# Patient Record
Sex: MALE | Race: WHITE | NOT HISPANIC OR LATINO | Employment: OTHER | URBAN - METROPOLITAN AREA
[De-identification: names, ages, dates, MRNs, and addresses within clinical notes are randomized per-mention and may not be internally consistent; named-entity substitution may affect disease eponyms.]

---

## 2023-03-29 DIAGNOSIS — E78.5 HYPERLIPIDEMIA, UNSPECIFIED: ICD-10-CM

## 2023-03-29 PROBLEM — I49.5 SINUS NODE DYSFUNCTION (MULTI): Status: ACTIVE | Noted: 2023-03-29

## 2023-03-29 PROBLEM — N18.30 CHRONIC KIDNEY DISEASE, STAGE 3 (MULTI): Status: ACTIVE | Noted: 2023-03-29

## 2023-03-29 PROBLEM — N18.30 CKD STAGE 3 SECONDARY TO DIABETES (MULTI): Status: ACTIVE | Noted: 2023-03-29

## 2023-03-29 PROBLEM — F03.90 DEMENTIA (MULTI): Status: ACTIVE | Noted: 2023-03-29

## 2023-03-29 PROBLEM — E11.22 CKD STAGE 3 SECONDARY TO DIABETES (MULTI): Status: ACTIVE | Noted: 2023-03-29

## 2023-03-29 PROBLEM — E11.3293 MILD NONPROLIFERATIVE DIABETIC RETINOPATHY OF BOTH EYES WITHOUT MACULAR EDEMA ASSOCIATED WITH TYPE 2 DIABETES MELLITUS (MULTI): Status: ACTIVE | Noted: 2023-03-29

## 2023-03-29 PROBLEM — Z86.0100 HISTORY OF COLON POLYPS: Status: ACTIVE | Noted: 2023-03-29

## 2023-03-29 PROBLEM — Z95.0 PACEMAKER: Status: ACTIVE | Noted: 2023-03-29

## 2023-03-29 PROBLEM — L40.9 PSORIASIS: Status: ACTIVE | Noted: 2023-03-29

## 2023-03-29 PROBLEM — J45.909 CHRONIC ASTHMA (HHS-HCC): Status: ACTIVE | Noted: 2023-03-29

## 2023-03-29 PROBLEM — G47.33 OBSTRUCTIVE SLEEP APNEA: Status: ACTIVE | Noted: 2023-03-29

## 2023-03-29 PROBLEM — Z86.010 HISTORY OF COLON POLYPS: Status: ACTIVE | Noted: 2023-03-29

## 2023-03-29 PROBLEM — I10 BENIGN ESSENTIAL HYPERTENSION: Status: ACTIVE | Noted: 2023-03-29

## 2023-03-29 PROBLEM — I49.9 IRREGULAR HEART RATE: Status: ACTIVE | Noted: 2023-03-29

## 2023-03-29 RX ORDER — DULAGLUTIDE 1.5 MG/.5ML
INJECTION, SOLUTION SUBCUTANEOUS
COMMUNITY
Start: 2020-11-02 | End: 2023-05-11 | Stop reason: WASHOUT

## 2023-03-29 RX ORDER — INSULIN ASPART 100 [IU]/ML
INJECTION, SUSPENSION SUBCUTANEOUS
COMMUNITY
Start: 2023-02-16

## 2023-03-29 RX ORDER — LOSARTAN POTASSIUM 50 MG/1
1 TABLET ORAL DAILY
COMMUNITY
Start: 2022-08-29 | End: 2023-05-11 | Stop reason: WASHOUT

## 2023-03-29 RX ORDER — HUMAN INSULIN 100 [IU]/ML
INJECTION, SUSPENSION SUBCUTANEOUS
COMMUNITY
Start: 2020-06-10 | End: 2023-11-14 | Stop reason: WASHOUT

## 2023-03-29 RX ORDER — CALCIPOTRIENE 50 UG/G
OINTMENT TOPICAL
COMMUNITY
Start: 2019-10-31 | End: 2023-05-11 | Stop reason: WASHOUT

## 2023-03-29 RX ORDER — ROSUVASTATIN CALCIUM 10 MG/1
TABLET, COATED ORAL
Qty: 90 TABLET | Refills: 3 | Status: SHIPPED | OUTPATIENT
Start: 2023-03-29 | End: 2024-02-26

## 2023-03-29 RX ORDER — FLUTICASONE PROPIONATE AND SALMETEROL 250; 50 UG/1; UG/1
1 POWDER RESPIRATORY (INHALATION) 2 TIMES DAILY
COMMUNITY

## 2023-03-29 RX ORDER — OLMESARTAN MEDOXOMIL 20 MG/1
1 TABLET ORAL 2 TIMES DAILY
COMMUNITY
Start: 2016-09-15 | End: 2024-05-13 | Stop reason: WASHOUT

## 2023-03-29 RX ORDER — AMLODIPINE BESYLATE 2.5 MG/1
2.5 TABLET ORAL DAILY
COMMUNITY
End: 2024-05-13 | Stop reason: WASHOUT

## 2023-03-29 RX ORDER — ASPIRIN 81 MG/1
1 TABLET ORAL DAILY
COMMUNITY

## 2023-03-29 RX ORDER — SEMAGLUTIDE 1.34 MG/ML
1 INJECTION, SOLUTION SUBCUTANEOUS
COMMUNITY
Start: 2023-02-24

## 2023-03-29 RX ORDER — PEN NEEDLE, DIABETIC 32 GX 1/4"
NEEDLE, DISPOSABLE MISCELLANEOUS
COMMUNITY
Start: 2022-06-14

## 2023-03-29 RX ORDER — LOSARTAN POTASSIUM 100 MG/1
TABLET ORAL
COMMUNITY
Start: 2022-05-09 | End: 2023-06-30 | Stop reason: SDUPTHER

## 2023-03-29 RX ORDER — METFORMIN HYDROCHLORIDE 500 MG/1
1 TABLET ORAL 2 TIMES DAILY
COMMUNITY
Start: 2015-12-21

## 2023-03-29 RX ORDER — DONEPEZIL HYDROCHLORIDE 10 MG/1
10 TABLET, FILM COATED ORAL DAILY
COMMUNITY
End: 2023-04-30

## 2023-03-29 RX ORDER — ACETAMINOPHEN 500 MG
1 TABLET ORAL DAILY
COMMUNITY

## 2023-03-29 RX ORDER — EZETIMIBE 10 MG/1
10 TABLET ORAL DAILY
COMMUNITY

## 2023-03-29 RX ORDER — ROSUVASTATIN CALCIUM 10 MG/1
10 TABLET, COATED ORAL NIGHTLY
COMMUNITY
End: 2023-03-29 | Stop reason: SDUPTHER

## 2023-03-29 RX ORDER — MEMANTINE HYDROCHLORIDE 28 MG/1
1 CAPSULE, EXTENDED RELEASE ORAL DAILY
COMMUNITY
Start: 2019-12-09 | End: 2023-06-30

## 2023-03-29 RX ORDER — DULAGLUTIDE 3 MG/.5ML
3 INJECTION, SOLUTION SUBCUTANEOUS
COMMUNITY
End: 2023-05-11 | Stop reason: WASHOUT

## 2023-03-29 RX ORDER — DAPAGLIFLOZIN 5 MG/1
10 TABLET, FILM COATED ORAL DAILY
COMMUNITY

## 2023-03-29 RX ORDER — INSULIN ASPART 100 [IU]/ML
INJECTION, SOLUTION INTRAVENOUS; SUBCUTANEOUS
COMMUNITY
Start: 2020-06-03 | End: 2023-05-11 | Stop reason: WASHOUT

## 2023-04-27 LAB
ALBUMIN (G/DL) IN SER/PLAS: 4.1 G/DL (ref 3.4–5)
ALBUMIN (MG/L) IN URINE: 332.5 MG/L
ALBUMIN/CREATININE (UG/MG) IN URINE: 407 UG/MG CRT (ref 0–30)
ANION GAP IN SER/PLAS: 11 MMOL/L (ref 10–20)
APPEARANCE, URINE: CLEAR
BILIRUBIN, URINE: NEGATIVE
BLOOD, URINE: NEGATIVE
CALCIDIOL (25 OH VITAMIN D3) (NG/ML) IN SER/PLAS: 64 NG/ML
CALCIUM (MG/DL) IN SER/PLAS: 9.5 MG/DL (ref 8.6–10.3)
CARBON DIOXIDE, TOTAL (MMOL/L) IN SER/PLAS: 31 MMOL/L (ref 21–32)
CHLORIDE (MMOL/L) IN SER/PLAS: 103 MMOL/L (ref 98–107)
COLOR, URINE: YELLOW
CREATININE (MG/DL) IN SER/PLAS: 1.16 MG/DL (ref 0.5–1.3)
CREATININE (MG/DL) IN URINE: 81.7 MG/DL (ref 20–370)
GFR MALE: 64 ML/MIN/1.73M2
GLUCOSE (MG/DL) IN SER/PLAS: 188 MG/DL (ref 74–99)
GLUCOSE, URINE: ABNORMAL MG/DL
KETONES, URINE: ABNORMAL MG/DL
LEUKOCYTE ESTERASE, URINE: NEGATIVE
NITRITE, URINE: NEGATIVE
PH, URINE: 5.5 (ref 5–8)
PHOSPHATE (MG/DL) IN SER/PLAS: 3.9 MG/DL (ref 2.5–4.9)
POTASSIUM (MMOL/L) IN SER/PLAS: 4.5 MMOL/L (ref 3.5–5.3)
PROTEIN, URINE: ABNORMAL MG/DL
RBC, URINE: 1 /HPF (ref 0–5)
SODIUM (MMOL/L) IN SER/PLAS: 140 MMOL/L (ref 136–145)
SPECIFIC GRAVITY, URINE: 1.01 (ref 1–1.03)
SQUAMOUS EPITHELIAL CELLS, URINE: <1 /HPF
UREA NITROGEN (MG/DL) IN SER/PLAS: 24 MG/DL (ref 6–23)
UROBILINOGEN, URINE: <2 MG/DL (ref 0–1.9)
WBC, URINE: 1 /HPF (ref 0–5)

## 2023-04-28 DIAGNOSIS — F03.90 UNSPECIFIED DEMENTIA, UNSPECIFIED SEVERITY, WITHOUT BEHAVIORAL DISTURBANCE, PSYCHOTIC DISTURBANCE, MOOD DISTURBANCE, AND ANXIETY (MULTI): ICD-10-CM

## 2023-04-28 LAB — PARATHYRIN INTACT (PG/ML) IN SER/PLAS: 73.2 PG/ML (ref 18.5–88)

## 2023-04-30 RX ORDER — DONEPEZIL HYDROCHLORIDE 10 MG/1
TABLET, FILM COATED ORAL
Qty: 90 TABLET | Refills: 3 | Status: SHIPPED | OUTPATIENT
Start: 2023-04-30

## 2023-05-10 PROBLEM — E11.3299 MILD NONPROLIFERATIVE DIABETIC RETINOPATHY WITHOUT MACULAR EDEMA ASSOCIATED WITH TYPE 2 DIABETES MELLITUS (MULTI): Status: ACTIVE | Noted: 2017-05-22

## 2023-05-10 PROBLEM — R80.9 ALBUMINURIA: Status: ACTIVE | Noted: 2017-05-22

## 2023-05-10 NOTE — PATIENT INSTRUCTIONS
Thank you for choosing [facility] for your healthcare.   As always if you have any questions or concerns please do not hesitate to call our office at [phone] or through [employer].    Have a great day!  Fatou Dawn, DO

## 2023-05-11 ENCOUNTER — OFFICE VISIT (OUTPATIENT)
Dept: PRIMARY CARE | Facility: CLINIC | Age: 78
End: 2023-05-11
Payer: MEDICARE

## 2023-05-11 VITALS
SYSTOLIC BLOOD PRESSURE: 112 MMHG | WEIGHT: 178 LBS | BODY MASS INDEX: 25.48 KG/M2 | HEIGHT: 70 IN | HEART RATE: 64 BPM | OXYGEN SATURATION: 98 % | DIASTOLIC BLOOD PRESSURE: 71 MMHG

## 2023-05-11 DIAGNOSIS — N18.30 CKD STAGE 3 SECONDARY TO DIABETES (MULTI): Chronic | ICD-10-CM

## 2023-05-11 DIAGNOSIS — E11.22 CKD STAGE 3 SECONDARY TO DIABETES (MULTI): Chronic | ICD-10-CM

## 2023-05-11 DIAGNOSIS — E11.65 TYPE 2 DIABETES MELLITUS WITH HYPERGLYCEMIA, WITH LONG-TERM CURRENT USE OF INSULIN (MULTI): Chronic | ICD-10-CM

## 2023-05-11 DIAGNOSIS — Z00.00 ROUTINE GENERAL MEDICAL EXAMINATION AT HEALTH CARE FACILITY: Primary | ICD-10-CM

## 2023-05-11 DIAGNOSIS — Z79.4 TYPE 2 DIABETES MELLITUS WITH HYPERGLYCEMIA, WITH LONG-TERM CURRENT USE OF INSULIN (MULTI): Chronic | ICD-10-CM

## 2023-05-11 DIAGNOSIS — I10 BENIGN ESSENTIAL HYPERTENSION: ICD-10-CM

## 2023-05-11 DIAGNOSIS — J45.30 MILD PERSISTENT ASTHMA WITHOUT COMPLICATION (HHS-HCC): ICD-10-CM

## 2023-05-11 PROBLEM — F03.90 DEMENTIA (MULTI): Chronic | Status: ACTIVE | Noted: 2023-03-29

## 2023-05-11 PROCEDURE — 3078F DIAST BP <80 MM HG: CPT | Performed by: FAMILY MEDICINE

## 2023-05-11 PROCEDURE — 1036F TOBACCO NON-USER: CPT | Performed by: FAMILY MEDICINE

## 2023-05-11 PROCEDURE — 3074F SYST BP LT 130 MM HG: CPT | Performed by: FAMILY MEDICINE

## 2023-05-11 PROCEDURE — 1159F MED LIST DOCD IN RCRD: CPT | Performed by: FAMILY MEDICINE

## 2023-05-11 PROCEDURE — 1160F RVW MEDS BY RX/DR IN RCRD: CPT | Performed by: FAMILY MEDICINE

## 2023-05-11 PROCEDURE — G0439 PPPS, SUBSEQ VISIT: HCPCS | Performed by: FAMILY MEDICINE

## 2023-05-11 PROCEDURE — 1170F FXNL STATUS ASSESSED: CPT | Performed by: FAMILY MEDICINE

## 2023-05-11 RX ORDER — HYDROCHLOROTHIAZIDE 25 MG/1
TABLET ORAL
COMMUNITY
Start: 2023-05-02 | End: 2023-05-11 | Stop reason: WASHOUT

## 2023-05-11 SDOH — ECONOMIC STABILITY: FOOD INSECURITY: WITHIN THE PAST 12 MONTHS, YOU WORRIED THAT YOUR FOOD WOULD RUN OUT BEFORE YOU GOT MONEY TO BUY MORE.: NEVER TRUE

## 2023-05-11 SDOH — ECONOMIC STABILITY: FOOD INSECURITY: WITHIN THE PAST 12 MONTHS, THE FOOD YOU BOUGHT JUST DIDN'T LAST AND YOU DIDN'T HAVE MONEY TO GET MORE.: NEVER TRUE

## 2023-05-11 ASSESSMENT — ACTIVITIES OF DAILY LIVING (ADL)
DOING_HOUSEWORK: INDEPENDENT
GROCERY_SHOPPING: INDEPENDENT
DRESSING: INDEPENDENT
BATHING: INDEPENDENT
TAKING_MEDICATION: INDEPENDENT
MANAGING_FINANCES: INDEPENDENT

## 2023-05-11 ASSESSMENT — LIFESTYLE VARIABLES
HOW MANY STANDARD DRINKS CONTAINING ALCOHOL DO YOU HAVE ON A TYPICAL DAY: 1 OR 2
HOW OFTEN DO YOU HAVE A DRINK CONTAINING ALCOHOL: MONTHLY OR LESS
SKIP TO QUESTIONS 9-10: 1
HOW OFTEN DO YOU HAVE SIX OR MORE DRINKS ON ONE OCCASION: NEVER
AUDIT-C TOTAL SCORE: 1

## 2023-05-11 ASSESSMENT — ENCOUNTER SYMPTOMS
DEPRESSION: 0
LOSS OF SENSATION IN FEET: 0
OCCASIONAL FEELINGS OF UNSTEADINESS: 0

## 2023-05-11 ASSESSMENT — PATIENT HEALTH QUESTIONNAIRE - PHQ9
1. LITTLE INTEREST OR PLEASURE IN DOING THINGS: NOT AT ALL
SUM OF ALL RESPONSES TO PHQ9 QUESTIONS 1 & 2: 0
2. FEELING DOWN, DEPRESSED OR HOPELESS: NOT AT ALL

## 2023-05-11 NOTE — ASSESSMENT & PLAN NOTE
On losartan from nephrology. Hydrochlorothiazide was recently added but had episode of hypotension. Discussed decreasing hydrochlorothiazide to half tab

## 2023-05-11 NOTE — PROGRESS NOTES
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     Past Medical, Surgical, and Family History reviewed and updated in chart.    Reviewed all medications by prescribing practitioner or clinical pharmacist (such as prescriptions, OTCs, herbal therapies and supplements) and documented in the medical record.    Endo switched him from Trulicity to Ozempic due to cost.    Nephrology added hydrochlorothiazide last week but patient experienced hypotension.         Patient Care Team:  Fatou Dawn DO as PCP - General  Jeff Shaver MD as Consulting Physician (Nephrology)  Greg Carrington MD as Consulting Physician (Endocrinology)     Review of Systems    Objective   Vitals:  /71   Pulse 64   Ht 1.778 m (5' 10\")   Wt 80.7 kg (178 lb)   SpO2 98%   BMI 25.54 kg/m²       Physical Exam  Constitutional: Well nourished, well developed, appears stated age  Eyes: no scleral icterus, no conjunctival injection  Ears: normal external auditory canal, no retraction or bulging of tympanic membranes  Neck: no thyromegaly  Cardiovascular: regular rate and rhythm, no leg edema  Respiratory: normal respiratory effort, clear to auscultation bilaterally  Skin: Warm, dry. No rashes  Neurologic: Alert, CNs II-XII grossly intact..  Psych: Appropriate mood and affect.      Assessment/Plan   Problem List Items Addressed This Visit          Respiratory    Mild persistent asthma without complication    Current Assessment & Plan     Stable on Adviar             Circulatory    Benign essential hypertension    Current Assessment & Plan     On losartan from nephrology. Hydrochlorothiazide was recently added but had episode of hypotension. Discussed decreasing hydrochlorothiazide to half tab            Endocrine/Metabolic    CKD stage 3 secondary to diabetes (CMS/[facility]) (Chronic)    Current Assessment & Plan     Managed by Dr. Shaver         Type 2 diabetes mellitus with " hyperglycemia, with long-term current use of insulin (CMS/[facility]) (Chronic)    Current Assessment & Plan     Managed by endocrinology          Other Visit Diagnoses       Routine general medical examination at health care facility    -  Primary    Colonoscopy done 7/2017 - normal            Follow up 6 months.   Virginia Factor, DO

## 2023-06-27 DIAGNOSIS — F03.90 UNSPECIFIED DEMENTIA, UNSPECIFIED SEVERITY, WITHOUT BEHAVIORAL DISTURBANCE, PSYCHOTIC DISTURBANCE, MOOD DISTURBANCE, AND ANXIETY (MULTI): ICD-10-CM

## 2023-06-27 DIAGNOSIS — I10 ESSENTIAL (PRIMARY) HYPERTENSION: ICD-10-CM

## 2023-06-30 RX ORDER — LOSARTAN POTASSIUM 50 MG/1
TABLET ORAL
Qty: 90 TABLET | Refills: 3 | Status: SHIPPED | OUTPATIENT
Start: 2023-06-30

## 2023-06-30 RX ORDER — FLASH GLUCOSE SCANNING READER
EACH MISCELLANEOUS AS NEEDED
COMMUNITY
Start: 2023-06-06

## 2023-06-30 RX ORDER — FLASH GLUCOSE SENSOR
KIT MISCELLANEOUS AS NEEDED
COMMUNITY
Start: 2023-06-06

## 2023-06-30 RX ORDER — MEMANTINE HYDROCHLORIDE 28 MG/1
CAPSULE, EXTENDED RELEASE ORAL
Qty: 90 CAPSULE | Refills: 3 | Status: SHIPPED | OUTPATIENT
Start: 2023-06-30

## 2023-09-18 RX ORDER — LOSARTAN POTASSIUM 100 MG/1
100 TABLET ORAL DAILY
COMMUNITY
End: 2023-11-14 | Stop reason: SDUPTHER

## 2023-10-17 ENCOUNTER — HOSPITAL ENCOUNTER (OUTPATIENT)
Dept: CARDIOLOGY | Facility: HOSPITAL | Age: 78
Discharge: HOME | End: 2023-10-17
Payer: MEDICARE

## 2023-10-17 DIAGNOSIS — Z95.0 PRESENCE OF CARDIAC PACEMAKER: ICD-10-CM

## 2023-10-17 DIAGNOSIS — I48.91 ATRIAL FIBRILLATION, UNSPECIFIED TYPE (MULTI): Primary | ICD-10-CM

## 2023-10-17 DIAGNOSIS — I48.91 UNSPECIFIED ATRIAL FIBRILLATION (MULTI): ICD-10-CM

## 2023-10-17 PROCEDURE — 93294 REM INTERROG EVL PM/LDLS PM: CPT | Performed by: INTERNAL MEDICINE

## 2023-10-17 PROCEDURE — 93296 REM INTERROG EVL PM/IDS: CPT

## 2023-10-18 ENCOUNTER — HOSPITAL ENCOUNTER (OUTPATIENT)
Dept: CARDIOLOGY | Facility: HOSPITAL | Age: 78
Discharge: HOME | End: 2023-10-18
Payer: MEDICARE

## 2023-10-18 DIAGNOSIS — I48.91 ATRIAL FIBRILLATION, UNSPECIFIED TYPE (MULTI): ICD-10-CM

## 2023-10-23 ENCOUNTER — LAB (OUTPATIENT)
Dept: LAB | Facility: LAB | Age: 78
End: 2023-10-23
Payer: MEDICARE

## 2023-10-23 DIAGNOSIS — E11.65 TYPE 2 DIABETES MELLITUS WITH HYPERGLYCEMIA (MULTI): ICD-10-CM

## 2023-10-23 DIAGNOSIS — N18.31 CHRONIC KIDNEY DISEASE, STAGE 3A (MULTI): Primary | ICD-10-CM

## 2023-10-23 DIAGNOSIS — Z79.4 LONG TERM (CURRENT) USE OF INSULIN (MULTI): ICD-10-CM

## 2023-10-23 LAB
25(OH)D3 SERPL-MCNC: 48 NG/ML (ref 30–100)
ALBUMIN SERPL BCP-MCNC: 4.5 G/DL (ref 3.4–5)
ALP SERPL-CCNC: 62 U/L (ref 33–136)
ALT SERPL W P-5'-P-CCNC: 30 U/L (ref 10–52)
ANION GAP SERPL CALC-SCNC: 12 MMOL/L (ref 10–20)
APPEARANCE UR: CLEAR
AST SERPL W P-5'-P-CCNC: 27 U/L (ref 9–39)
BILIRUB SERPL-MCNC: 1.6 MG/DL (ref 0–1.2)
BILIRUB UR STRIP.AUTO-MCNC: NEGATIVE MG/DL
BUN SERPL-MCNC: 21 MG/DL (ref 6–23)
CALCIUM SERPL-MCNC: 10.2 MG/DL (ref 8.6–10.3)
CHLORIDE SERPL-SCNC: 104 MMOL/L (ref 98–107)
CHOLEST SERPL-MCNC: 79 MG/DL (ref 0–199)
CHOLESTEROL/HDL RATIO: 2.5
CO2 SERPL-SCNC: 30 MMOL/L (ref 21–32)
COLOR UR: YELLOW
CREAT SERPL-MCNC: 1.1 MG/DL (ref 0.5–1.3)
CREAT UR-MCNC: 95.4 MG/DL (ref 20–370)
GFR SERPL CREATININE-BSD FRML MDRD: 68 ML/MIN/1.73M*2
GLUCOSE SERPL-MCNC: 179 MG/DL (ref 74–99)
GLUCOSE UR STRIP.AUTO-MCNC: ABNORMAL MG/DL
HDLC SERPL-MCNC: 31.8 MG/DL
KETONES UR STRIP.AUTO-MCNC: NEGATIVE MG/DL
LDLC SERPL CALC-MCNC: 20 MG/DL
LEUKOCYTE ESTERASE UR QL STRIP.AUTO: NEGATIVE
MICROALBUMIN UR-MCNC: 259.6 MG/L
MICROALBUMIN/CREAT UR: 272.1 UG/MG CREAT
MUCOUS THREADS #/AREA URNS AUTO: NORMAL /LPF
NITRITE UR QL STRIP.AUTO: NEGATIVE
NON HDL CHOLESTEROL: 47 MG/DL (ref 0–149)
PH UR STRIP.AUTO: 6 [PH]
PHOSPHATE SERPL-MCNC: 4.3 MG/DL (ref 2.5–4.9)
POTASSIUM SERPL-SCNC: 4.5 MMOL/L (ref 3.5–5.3)
PROT SERPL-MCNC: 6.9 G/DL (ref 6.4–8.2)
PROT UR STRIP.AUTO-MCNC: ABNORMAL MG/DL
PTH-INTACT SERPL-MCNC: 50.6 PG/ML (ref 18.5–88)
RBC # UR STRIP.AUTO: NEGATIVE /UL
RBC #/AREA URNS AUTO: NORMAL /HPF
SODIUM SERPL-SCNC: 141 MMOL/L (ref 136–145)
SP GR UR STRIP.AUTO: 1.03
TRIGL SERPL-MCNC: 136 MG/DL (ref 0–149)
TSH SERPL-ACNC: 2.7 MIU/L (ref 0.44–3.98)
UROBILINOGEN UR STRIP.AUTO-MCNC: <2 MG/DL
VLDL: 27 MG/DL (ref 0–40)
WBC #/AREA URNS AUTO: NORMAL /HPF

## 2023-10-23 PROCEDURE — 81001 URINALYSIS AUTO W/SCOPE: CPT

## 2023-10-23 PROCEDURE — 80061 LIPID PANEL: CPT

## 2023-10-23 PROCEDURE — 83970 ASSAY OF PARATHORMONE: CPT

## 2023-10-23 PROCEDURE — 84100 ASSAY OF PHOSPHORUS: CPT

## 2023-10-23 PROCEDURE — 82570 ASSAY OF URINE CREATININE: CPT

## 2023-10-23 PROCEDURE — 82306 VITAMIN D 25 HYDROXY: CPT

## 2023-10-23 PROCEDURE — 80053 COMPREHEN METABOLIC PANEL: CPT

## 2023-10-23 PROCEDURE — 82043 UR ALBUMIN QUANTITATIVE: CPT

## 2023-10-23 PROCEDURE — 84443 ASSAY THYROID STIM HORMONE: CPT

## 2023-10-23 PROCEDURE — 83036 HEMOGLOBIN GLYCOSYLATED A1C: CPT

## 2023-10-23 PROCEDURE — 36415 COLL VENOUS BLD VENIPUNCTURE: CPT

## 2023-10-24 LAB
EST. AVERAGE GLUCOSE BLD GHB EST-MCNC: 177 MG/DL
HBA1C MFR BLD: 7.8 %

## 2023-11-14 ENCOUNTER — OFFICE VISIT (OUTPATIENT)
Dept: PRIMARY CARE | Facility: CLINIC | Age: 78
End: 2023-11-14
Payer: MEDICARE

## 2023-11-14 VITALS
DIASTOLIC BLOOD PRESSURE: 70 MMHG | OXYGEN SATURATION: 98 % | HEART RATE: 78 BPM | WEIGHT: 168 LBS | BODY MASS INDEX: 24.05 KG/M2 | TEMPERATURE: 97.5 F | RESPIRATION RATE: 16 BRPM | SYSTOLIC BLOOD PRESSURE: 130 MMHG | HEIGHT: 70 IN

## 2023-11-14 DIAGNOSIS — F03.B0 MODERATE DEMENTIA WITHOUT BEHAVIORAL DISTURBANCE, PSYCHOTIC DISTURBANCE, MOOD DISTURBANCE, OR ANXIETY, UNSPECIFIED DEMENTIA TYPE (MULTI): Primary | Chronic | ICD-10-CM

## 2023-11-14 DIAGNOSIS — E11.65 TYPE 2 DIABETES MELLITUS WITH HYPERGLYCEMIA, WITH LONG-TERM CURRENT USE OF INSULIN (MULTI): Chronic | ICD-10-CM

## 2023-11-14 DIAGNOSIS — Z79.4 TYPE 2 DIABETES MELLITUS WITH HYPERGLYCEMIA, WITH LONG-TERM CURRENT USE OF INSULIN (MULTI): Chronic | ICD-10-CM

## 2023-11-14 PROBLEM — I49.5 SINUS NODE DYSFUNCTION (MULTI): Status: RESOLVED | Noted: 2023-03-29 | Resolved: 2023-11-14

## 2023-11-14 PROBLEM — I49.9 IRREGULAR HEART RATE: Status: RESOLVED | Noted: 2023-03-29 | Resolved: 2023-11-14

## 2023-11-14 PROCEDURE — 1126F AMNT PAIN NOTED NONE PRSNT: CPT | Performed by: FAMILY MEDICINE

## 2023-11-14 PROCEDURE — 1159F MED LIST DOCD IN RCRD: CPT | Performed by: FAMILY MEDICINE

## 2023-11-14 PROCEDURE — 3075F SYST BP GE 130 - 139MM HG: CPT | Performed by: FAMILY MEDICINE

## 2023-11-14 PROCEDURE — 1160F RVW MEDS BY RX/DR IN RCRD: CPT | Performed by: FAMILY MEDICINE

## 2023-11-14 PROCEDURE — 99213 OFFICE O/P EST LOW 20 MIN: CPT | Performed by: FAMILY MEDICINE

## 2023-11-14 PROCEDURE — 1036F TOBACCO NON-USER: CPT | Performed by: FAMILY MEDICINE

## 2023-11-14 PROCEDURE — 3078F DIAST BP <80 MM HG: CPT | Performed by: FAMILY MEDICINE

## 2023-11-14 RX ORDER — CICLOPIROX OLAMINE 7.7 MG/G
CREAM TOPICAL 2 TIMES DAILY
COMMUNITY
Start: 2023-09-06 | End: 2023-11-14 | Stop reason: ALTCHOICE

## 2023-11-14 ASSESSMENT — LIFESTYLE VARIABLES
SKIP TO QUESTIONS 9-10: 1
HOW OFTEN DO YOU HAVE SIX OR MORE DRINKS ON ONE OCCASION: NEVER
HOW MANY STANDARD DRINKS CONTAINING ALCOHOL DO YOU HAVE ON A TYPICAL DAY: PATIENT DOES NOT DRINK
AUDIT-C TOTAL SCORE: 0
HOW OFTEN DO YOU HAVE A DRINK CONTAINING ALCOHOL: NEVER

## 2023-11-14 ASSESSMENT — PATIENT HEALTH QUESTIONNAIRE - PHQ9
2. FEELING DOWN, DEPRESSED OR HOPELESS: NOT AT ALL
1. LITTLE INTEREST OR PLEASURE IN DOING THINGS: NOT AT ALL
SUM OF ALL RESPONSES TO PHQ9 QUESTIONS 1 & 2: 0

## 2023-11-14 ASSESSMENT — PAIN SCALES - GENERAL: PAINLEVEL: 0-NO PAIN

## 2023-11-14 NOTE — ASSESSMENT & PLAN NOTE
Managed by endocrinology  Improving  Patient assistance program application for Ozempic provided  Diabetic foot exam done today

## 2023-11-14 NOTE — PROGRESS NOTES
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 No concerns.) and Diabetes.  Followed up with nephrology recently and kidney function has improved on Farxiga.    No new problems or concerns.     Established with podiatry for diabetic nail care.             Current Outpatient Medications   Medication Sig Dispense Refill    alendronate-vitamin D3 (Fosamax Plus D) 70 mg- 5,600 unit tablet Take by mouth.      amLODIPine (Norvasc) 2.5 mg tablet Take 1 tablet (2.5 mg) by mouth once daily.      aspirin 81 mg EC tablet Take 1 tablet (81 mg) by mouth once daily.      BD Ultra-Fine Micro Pen Needle 32 gauge x 1/4\" needle use as directed      cholecalciferol (Vitamin D-3) 5,000 Units tablet Take 1 tablet (5,000 Units) by mouth once daily.      dapagliflozin (Farxiga) 5 mg Take by mouth.      donepezil (Aricept) 10 mg tablet TAKE 1 TABLET BY MOUTH EVERY DAY AS DIRECTED 90 tablet 3    ezetimibe (Zetia) 10 mg tablet Take 1 tablet (10 mg) by mouth once daily.      fluticasone propion-salmeteroL (Advair Diskus) 250-50 mcg/dose diskus inhaler Inhale 1 puff 2 times a day.      FreeStyle Katie 2 [place] misc Inject under the skin if needed.      FreeStyle Katie 2 Sensor kit Inject under the skin if needed.      losartan (Cozaar) 50 mg tablet TAKE 1 TABLET BY MOUTH EVERY DAY 90 tablet 3    memantine (Namenda) 28 mg capsule,sprinkle,ER 24hr TAKE 1 CAPSULE BY MOUTH EVERY DAY 90 capsule 3    metFORMIN (Glucophage) 500 mg tablet Take 1 tablet (500 mg) by mouth 2 times a day.      multivitamin with minerals (multivitamin-iron-folic acid) tablet Take 1 tablet by mouth once daily.      NovoLOG Mix 70-30FlexPen U-100 100 unit/mL (70-30) injection INJECT 45 UNITS BEFORE BREAKFAST AND 10 UNITS BEFORE DINNER      olmesartan (BENIcar) 20 mg tablet Take 1 tablet (20 mg) by mouth 2 times a day.      Ozempic 1 mg/dose (4 mg/3 mL) pen injector Inject 1 mg under the skin 1 (one) time per " "week.      rosuvastatin (Crestor) 10 mg tablet TAKE 1 TABLET BY MOUTH EVERYDAY AT BEDTIME 90 tablet 3     No current facility-administered medications for this visit.       Objective     Visit Vitals  /70 (BP Location: Left arm, Patient Position: Sitting, BP Cuff Size: Large adult)   Pulse 78   Temp 36.4 °C (97.5 °F)   Resp 16   Ht 1.778 m (5' 10\")   Wt 76.2 kg (168 lb)   SpO2 98%   BMI 24.11 kg/m²   Smoking Status Former   BSA 1.94 m²        Constitutional: Well nourished, well developed, appears stated age  Eyes: no scleral icterus, no conjunctival injection  Ears: normal external auditory canal, no retraction or bulging of tympanic membranes  Neck: no thyromegaly  Cardiovascular: regular rate and rhythm, no leg edema  Respiratory: normal respiratory effort, clear to auscultation bilaterally  Skin: Warm, dry. No rashes  Neurologic: Alert, CNs II-XII grossly intact..  Psych: Appropriate mood and affect.      Diabetic Foot Exam  Right Foot  Pulses:  Dorsalis pedis  +2  Edema: There is no lower extremity edema   Sensation to 5.07 Berger-Wilman nylon filament: Last performed today. Diminished sensation of toes and distal foot.                                 Skin:      Skin Condition:  no abnormal pigmentation, no eczematous changes, no evidence of bleeding or bruising, and no periungual infections.                There is not evidence of macerated tissue between toe spaces.       Nail Exam: dystrophic nails.     Open ulcerations: No.      Calluses: No.    Left Foot  Pulses:  Dorsalis pedis  +2  Edema: There is no lower extremity edema   Sensation to 5.07 Berger-Wilman nylon filament: Last performed today. Diminished sensation of toes and distal foot.                                                         Skin:      Skin Condition:  no abnormal pigmentation, no eczematous changes, no evidence of bleeding or bruising, and no periungual infections.                There is not evidence of macerated tissue " between toe spaces.     Nail Exam: normal nails without lesions.     Open ulcerations: No.      Calluses: No.        Assessment/Plan   Problem List Items Addressed This Visit       Dementia (CMS/[facility]) - Primary (Chronic)     stable on Aricept and Namenda  short term memory is a problem         Type 2 diabetes mellitus with hyperglycemia, with long-term current use of insulin (CMS/[facility]) (Chronic)     Managed by endocrinology  Improving  Patient assistance program application for Ozempic provided  Diabetic foot exam done today            Follow up 6 months. Flu shot up to date.    Fatou Dawn, DO

## 2023-11-14 NOTE — PATIENT INSTRUCTIONS
Thank you for choosing [facility] for your healthcare.   As always if you have any questions or concerns please do not hesitate to call our office at [phone] or through [employer].    Have a great day!  Fatou Dawn, DO

## 2023-12-21 ENCOUNTER — HOSPITAL ENCOUNTER (OUTPATIENT)
Dept: CARDIOLOGY | Facility: HOSPITAL | Age: 78
Discharge: HOME | End: 2023-12-21
Payer: MEDICARE

## 2023-12-21 DIAGNOSIS — I48.91 ATRIAL FIBRILLATION, UNSPECIFIED TYPE (MULTI): ICD-10-CM

## 2023-12-28 ENCOUNTER — HOSPITAL ENCOUNTER (OUTPATIENT)
Dept: CARDIOLOGY | Facility: HOSPITAL | Age: 78
Discharge: HOME | End: 2023-12-28
Payer: MEDICARE

## 2023-12-28 DIAGNOSIS — I48.91 ATRIAL FIBRILLATION, UNSPECIFIED TYPE (MULTI): ICD-10-CM

## 2024-01-29 ENCOUNTER — HOSPITAL ENCOUNTER (OUTPATIENT)
Dept: CARDIOLOGY | Facility: HOSPITAL | Age: 79
Discharge: HOME | End: 2024-01-29
Payer: MEDICARE

## 2024-01-29 DIAGNOSIS — Z95.0 PRESENCE OF CARDIAC PACEMAKER: Primary | ICD-10-CM

## 2024-01-29 DIAGNOSIS — I48.91 ATRIAL FIBRILLATION, UNSPECIFIED TYPE (MULTI): ICD-10-CM

## 2024-01-29 PROCEDURE — 93290 INTERROG DEV EVAL ICPMS IP: CPT | Performed by: INTERNAL MEDICINE

## 2024-01-29 PROCEDURE — 93280 PM DEVICE PROGR EVAL DUAL: CPT

## 2024-01-29 PROCEDURE — 93280 PM DEVICE PROGR EVAL DUAL: CPT | Performed by: INTERNAL MEDICINE

## 2024-02-26 DIAGNOSIS — E78.5 HYPERLIPIDEMIA, UNSPECIFIED: ICD-10-CM

## 2024-02-26 RX ORDER — SPIRONOLACTONE 25 MG/1
25 TABLET ORAL DAILY
COMMUNITY
End: 2024-05-13 | Stop reason: WASHOUT

## 2024-02-26 RX ORDER — ROSUVASTATIN CALCIUM 10 MG/1
TABLET, COATED ORAL
Qty: 90 TABLET | Refills: 3 | Status: SHIPPED | OUTPATIENT
Start: 2024-02-26

## 2024-05-10 NOTE — PROGRESS NOTES
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  HPI    In addition to that documented in the HPI above, the additional ROS was obtained:  Constitutional: Denies fevers or chills  Eyes: Denies vision changes  ENMT: Denies trouble swallowing  Cardiovascular: Denies chest pain or heart racing  Respiratory: Denies SOB or cough      Patient Care Team:  Fatou Dawn DO as PCP - General  Fatou Dawn DO as PCP - [facility]CO Attributed Provider  Shyla Gallegos OD as Consulting Physician (Optometry)  Jeff Shaver MD as Consulting Physician (Nephrology)  Greg Carrington MD as Consulting Physician (Endocrinology)  Cristhian Underwood DPM as Consulting Physician (Podiatry)    Current Outpatient Medications   Medication Sig Dispense Refill    aspirin 81 mg EC tablet Take 1 tablet (81 mg) by mouth once daily.      BD Ultra-Fine Micro Pen Needle 32 gauge x 1/4\" needle use as directed      cholecalciferol (Vitamin D-3) 5,000 Units tablet Take 1 tablet (5,000 Units) by mouth once daily.      dapagliflozin (Farxiga) 5 mg Take 2 tablets (10 mg) by mouth once daily.      donepezil (Aricept) 10 mg tablet TAKE 1 TABLET BY MOUTH EVERY DAY AS DIRECTED 90 tablet 3    ezetimibe (Zetia) 10 mg tablet Take 1 tablet (10 mg) by mouth once daily.      fluticasone propion-salmeteroL (Advair Diskus) 250-50 mcg/dose diskus inhaler Inhale 1 puff 2 times a day.      FreeStyle Katie 2 [place] misc Inject under the skin if needed.      FreeStyle Katie 2 Sensor kit Inject under the skin if needed.      losartan (Cozaar) 50 mg tablet TAKE 1 TABLET BY MOUTH EVERY DAY (Patient taking differently: Take 2 tablets (100 mg) by mouth once daily.) 90 tablet 3    memantine (Namenda) 28 mg capsule,sprinkle,ER 24hr TAKE 1 CAPSULE BY MOUTH EVERY DAY 90 capsule 3    metFORMIN (Glucophage) 500 mg tablet Take 1 tablet (500 mg) by mouth 2 times a day.      " "multivitamin with minerals (multivitamin-iron-folic acid) tablet Take 1 tablet by mouth once daily.      NovoLOG Mix 70-30FlexPen U-100 100 unit/mL (70-30) injection 35 units in the morning and 6 units in the evening. Uses sliding scale.      Ozempic 1 mg/dose (4 mg/3 mL) pen injector Inject 1 mg under the skin 1 (one) time per week.      rosuvastatin (Crestor) 10 mg tablet TAKE 1 TABLET BY MOUTH EVERYDAY AT BEDTIME 90 tablet 3     No current facility-administered medications for this visit.       Objective     Visit Vitals  /70 (BP Location: Left arm, Patient Position: Sitting, BP Cuff Size: Adult)   Pulse 60   Temp 36.4 °C (97.5 °F)   Resp 16   Ht 1.74 m (5' 8.5\")   Wt 76.2 kg (168 lb)   SpO2 98%   BMI 25.17 kg/m²   Smoking Status Former   BSA 1.92 m²        Constitutional: Well nourished, well developed, appears stated age  Eyes: no scleral icterus, no conjunctival injection  Ears: normal external auditory canal, no retraction or bulging of tympanic membranes  Neck: no thyromegaly  Cardiovascular: regular rate and rhythm, no leg edema  Respiratory: normal respiratory effort, clear to auscultation bilaterally  Musculoskeletal: No gross deformities appreciated  Skin: Warm, dry. No rashes  Neurologic: Alert, CNs II-XII grossly intact..  Psych: Appropriate mood and affect.        Assessment/Plan   Problem List Items Addressed This Visit       Moderate dementia without behavioral disturbance, psychotic disturbance, mood disturbance, or anxiety (Multi) (Chronic)     stable on Aricept and Namenda  short term memory is a problem         Mild nonproliferative diabetic retinopathy of both eyes without macular edema associated with type 2 diabetes mellitus (Multi) (Chronic)     Under care of ophtho         Type 2 diabetes mellitus with hyperglycemia, with long-term current use of insulin (Multi) (Chronic)     Managed by endocrinology  Stable            Other Visit Diagnoses       Routine general medical examination at " health care facility    -  Primary    preventative care up to date            Follow up 6 months.    Fatou Dawn, DO

## 2024-05-10 NOTE — PATIENT INSTRUCTIONS
Thank you for choosing [facility] for your healthcare.   As always if you have any questions or concerns please do not hesitate to call our office at [phone] or through [employer].    Have a great day!  Fatou Dawn, DO

## 2024-05-13 ENCOUNTER — OFFICE VISIT (OUTPATIENT)
Dept: PRIMARY CARE | Facility: CLINIC | Age: 79
End: 2024-05-13
Payer: MEDICARE

## 2024-05-13 VITALS
WEIGHT: 168 LBS | TEMPERATURE: 97.5 F | BODY MASS INDEX: 24.88 KG/M2 | DIASTOLIC BLOOD PRESSURE: 70 MMHG | OXYGEN SATURATION: 98 % | SYSTOLIC BLOOD PRESSURE: 116 MMHG | RESPIRATION RATE: 16 BRPM | HEIGHT: 69 IN | HEART RATE: 60 BPM

## 2024-05-13 DIAGNOSIS — F03.B0 MODERATE DEMENTIA WITHOUT BEHAVIORAL DISTURBANCE, PSYCHOTIC DISTURBANCE, MOOD DISTURBANCE, OR ANXIETY, UNSPECIFIED DEMENTIA TYPE (MULTI): ICD-10-CM

## 2024-05-13 DIAGNOSIS — Z00.00 ROUTINE GENERAL MEDICAL EXAMINATION AT HEALTH CARE FACILITY: Primary | ICD-10-CM

## 2024-05-13 DIAGNOSIS — E11.3293 MILD NONPROLIFERATIVE DIABETIC RETINOPATHY OF BOTH EYES WITHOUT MACULAR EDEMA ASSOCIATED WITH TYPE 2 DIABETES MELLITUS (MULTI): ICD-10-CM

## 2024-05-13 DIAGNOSIS — E11.65 TYPE 2 DIABETES MELLITUS WITH HYPERGLYCEMIA, WITH LONG-TERM CURRENT USE OF INSULIN (MULTI): Chronic | ICD-10-CM

## 2024-05-13 DIAGNOSIS — Z79.4 TYPE 2 DIABETES MELLITUS WITH HYPERGLYCEMIA, WITH LONG-TERM CURRENT USE OF INSULIN (MULTI): Chronic | ICD-10-CM

## 2024-05-13 PROBLEM — I26.99 PULMONARY EMBOLISM (MULTI): Status: ACTIVE | Noted: 2024-05-13

## 2024-05-13 PROBLEM — E87.5 HYPERKALEMIA: Status: ACTIVE | Noted: 2024-05-13

## 2024-05-13 PROBLEM — G62.9 NEUROPATHY: Status: ACTIVE | Noted: 2024-05-13

## 2024-05-13 PROBLEM — M51.36 DEGENERATION OF LUMBAR INTERVERTEBRAL DISC: Status: ACTIVE | Noted: 2024-05-13

## 2024-05-13 PROBLEM — N18.2 CHRONIC KIDNEY DISEASE (CKD), STAGE II (MILD): Status: ACTIVE | Noted: 2023-03-29

## 2024-05-13 PROBLEM — I26.99 PULMONARY EMBOLISM (MULTI): Status: RESOLVED | Noted: 2024-05-13 | Resolved: 2024-05-13

## 2024-05-13 PROBLEM — M51.369 DEGENERATION OF LUMBAR INTERVERTEBRAL DISC: Status: ACTIVE | Noted: 2024-05-13

## 2024-05-13 PROCEDURE — 1036F TOBACCO NON-USER: CPT | Performed by: FAMILY MEDICINE

## 2024-05-13 PROCEDURE — 1159F MED LIST DOCD IN RCRD: CPT | Performed by: FAMILY MEDICINE

## 2024-05-13 PROCEDURE — 1170F FXNL STATUS ASSESSED: CPT | Performed by: FAMILY MEDICINE

## 2024-05-13 PROCEDURE — 1160F RVW MEDS BY RX/DR IN RCRD: CPT | Performed by: FAMILY MEDICINE

## 2024-05-13 PROCEDURE — 3074F SYST BP LT 130 MM HG: CPT | Performed by: FAMILY MEDICINE

## 2024-05-13 PROCEDURE — G0439 PPPS, SUBSEQ VISIT: HCPCS | Performed by: FAMILY MEDICINE

## 2024-05-13 PROCEDURE — 1126F AMNT PAIN NOTED NONE PRSNT: CPT | Performed by: FAMILY MEDICINE

## 2024-05-13 PROCEDURE — 3078F DIAST BP <80 MM HG: CPT | Performed by: FAMILY MEDICINE

## 2024-05-13 SDOH — ECONOMIC STABILITY: FOOD INSECURITY: WITHIN THE PAST 12 MONTHS, YOU WORRIED THAT YOUR FOOD WOULD RUN OUT BEFORE YOU GOT MONEY TO BUY MORE.: NEVER TRUE

## 2024-05-13 SDOH — ECONOMIC STABILITY: FOOD INSECURITY: WITHIN THE PAST 12 MONTHS, THE FOOD YOU BOUGHT JUST DIDN'T LAST AND YOU DIDN'T HAVE MONEY TO GET MORE.: NEVER TRUE

## 2024-05-13 ASSESSMENT — PAIN SCALES - GENERAL: PAINLEVEL: 0-NO PAIN

## 2024-05-13 ASSESSMENT — ACTIVITIES OF DAILY LIVING (ADL)
GROCERY_SHOPPING: INDEPENDENT
TAKING_MEDICATION: NEEDS ASSISTANCE
DRESSING: INDEPENDENT
BATHING: INDEPENDENT
DOING_HOUSEWORK: INDEPENDENT
MANAGING_FINANCES: INDEPENDENT

## 2024-05-13 ASSESSMENT — LIFESTYLE VARIABLES
HOW OFTEN DO YOU HAVE SIX OR MORE DRINKS ON ONE OCCASION: NEVER
HOW MANY STANDARD DRINKS CONTAINING ALCOHOL DO YOU HAVE ON A TYPICAL DAY: 1 OR 2
SKIP TO QUESTIONS 9-10: 1
HOW OFTEN DO YOU HAVE A DRINK CONTAINING ALCOHOL: MONTHLY OR LESS
AUDIT-C TOTAL SCORE: 1

## 2024-05-13 ASSESSMENT — PATIENT HEALTH QUESTIONNAIRE - PHQ9
1. LITTLE INTEREST OR PLEASURE IN DOING THINGS: NOT AT ALL
2. FEELING DOWN, DEPRESSED OR HOPELESS: NOT AT ALL
SUM OF ALL RESPONSES TO PHQ9 QUESTIONS 1 & 2: 0

## 2024-05-13 ASSESSMENT — ENCOUNTER SYMPTOMS
LOSS OF SENSATION IN FEET: 0
DEPRESSION: 0
OCCASIONAL FEELINGS OF UNSTEADINESS: 0

## 2024-05-15 ENCOUNTER — LAB (OUTPATIENT)
Dept: LAB | Facility: LAB | Age: 79
End: 2024-05-15
Payer: MEDICARE

## 2024-05-15 DIAGNOSIS — N18.2 CHRONIC KIDNEY DISEASE, STAGE 2 (MILD): Primary | ICD-10-CM

## 2024-05-15 LAB
25(OH)D3 SERPL-MCNC: 45 NG/ML (ref 30–100)
ALBUMIN SERPL BCP-MCNC: 4.2 G/DL (ref 3.4–5)
ANION GAP SERPL CALC-SCNC: 12 MMOL/L (ref 10–20)
APPEARANCE UR: ABNORMAL
BILIRUB UR STRIP.AUTO-MCNC: NEGATIVE MG/DL
BUN SERPL-MCNC: 21 MG/DL (ref 6–23)
CALCIUM SERPL-MCNC: 9.7 MG/DL (ref 8.6–10.3)
CHLORIDE SERPL-SCNC: 103 MMOL/L (ref 98–107)
CO2 SERPL-SCNC: 30 MMOL/L (ref 21–32)
COLOR UR: YELLOW
CREAT SERPL-MCNC: 1.2 MG/DL (ref 0.5–1.3)
CREAT UR-MCNC: 60.8 MG/DL (ref 20–370)
EGFRCR SERPLBLD CKD-EPI 2021: 61 ML/MIN/1.73M*2
GLUCOSE SERPL-MCNC: 204 MG/DL (ref 74–99)
GLUCOSE UR STRIP.AUTO-MCNC: ABNORMAL MG/DL
KETONES UR STRIP.AUTO-MCNC: NEGATIVE MG/DL
LEUKOCYTE ESTERASE UR QL STRIP.AUTO: NEGATIVE
MAGNESIUM SERPL-MCNC: 2.33 MG/DL (ref 1.6–2.4)
MICROALBUMIN UR-MCNC: 214.3 MG/L
MICROALBUMIN/CREAT UR: 352.5 UG/MG CREAT
NITRITE UR QL STRIP.AUTO: NEGATIVE
PH UR STRIP.AUTO: 6 [PH]
PHOSPHATE SERPL-MCNC: 3.5 MG/DL (ref 2.5–4.9)
POTASSIUM SERPL-SCNC: 4.2 MMOL/L (ref 3.5–5.3)
PROT UR STRIP.AUTO-MCNC: ABNORMAL MG/DL
PTH-INTACT SERPL-MCNC: 55.3 PG/ML (ref 18.5–88)
RBC # UR STRIP.AUTO: NEGATIVE /UL
RBC #/AREA URNS AUTO: NORMAL /HPF
SODIUM SERPL-SCNC: 141 MMOL/L (ref 136–145)
SP GR UR STRIP.AUTO: 1.03
UROBILINOGEN UR STRIP.AUTO-MCNC: <2 MG/DL
WBC #/AREA URNS AUTO: NORMAL /HPF

## 2024-05-15 PROCEDURE — 82570 ASSAY OF URINE CREATININE: CPT

## 2024-05-15 PROCEDURE — 83970 ASSAY OF PARATHORMONE: CPT

## 2024-05-15 PROCEDURE — 83735 ASSAY OF MAGNESIUM: CPT

## 2024-05-15 PROCEDURE — 82043 UR ALBUMIN QUANTITATIVE: CPT

## 2024-05-15 PROCEDURE — 82306 VITAMIN D 25 HYDROXY: CPT

## 2024-05-15 PROCEDURE — 80069 RENAL FUNCTION PANEL: CPT

## 2024-05-15 PROCEDURE — 36415 COLL VENOUS BLD VENIPUNCTURE: CPT

## 2024-05-15 PROCEDURE — 81001 URINALYSIS AUTO W/SCOPE: CPT

## 2024-05-28 ENCOUNTER — HOSPITAL ENCOUNTER (OUTPATIENT)
Dept: CARDIOLOGY | Facility: HOSPITAL | Age: 79
Discharge: HOME | End: 2024-05-28
Payer: MEDICARE

## 2024-05-28 DIAGNOSIS — I48.91 ATRIAL FIBRILLATION, UNSPECIFIED TYPE (MULTI): Primary | ICD-10-CM

## 2024-05-28 DIAGNOSIS — I48.91 ATRIAL FIBRILLATION, UNSPECIFIED TYPE (MULTI): ICD-10-CM

## 2024-05-28 PROCEDURE — 93296 REM INTERROG EVL PM/IDS: CPT

## 2024-06-13 ENCOUNTER — APPOINTMENT (OUTPATIENT)
Dept: PRIMARY CARE | Facility: CLINIC | Age: 79
End: 2024-06-13
Payer: MEDICARE

## 2024-06-24 DIAGNOSIS — F03.90 UNSPECIFIED DEMENTIA, UNSPECIFIED SEVERITY, WITHOUT BEHAVIORAL DISTURBANCE, PSYCHOTIC DISTURBANCE, MOOD DISTURBANCE, AND ANXIETY (MULTI): ICD-10-CM

## 2024-06-24 RX ORDER — DONEPEZIL HYDROCHLORIDE 10 MG/1
TABLET, FILM COATED ORAL
Qty: 90 TABLET | Refills: 3 | Status: SHIPPED | OUTPATIENT
Start: 2024-06-24

## 2024-06-30 ENCOUNTER — HOSPITAL ENCOUNTER (EMERGENCY)
Facility: HOSPITAL | Age: 79
Discharge: HOME | End: 2024-06-30
Payer: MEDICARE

## 2024-06-30 ENCOUNTER — APPOINTMENT (OUTPATIENT)
Dept: RADIOLOGY | Facility: HOSPITAL | Age: 79
End: 2024-06-30
Payer: MEDICARE

## 2024-06-30 VITALS
DIASTOLIC BLOOD PRESSURE: 72 MMHG | HEIGHT: 71 IN | RESPIRATION RATE: 16 BRPM | OXYGEN SATURATION: 97 % | BODY MASS INDEX: 23.8 KG/M2 | WEIGHT: 170 LBS | HEART RATE: 85 BPM | SYSTOLIC BLOOD PRESSURE: 174 MMHG | TEMPERATURE: 96.8 F

## 2024-06-30 DIAGNOSIS — R10.9 ACUTE LEFT FLANK PAIN: Primary | ICD-10-CM

## 2024-06-30 LAB
ALBUMIN SERPL BCP-MCNC: 4 G/DL (ref 3.4–5)
ALP SERPL-CCNC: 59 U/L (ref 33–136)
ALT SERPL W P-5'-P-CCNC: 45 U/L (ref 10–52)
ANION GAP SERPL CALC-SCNC: 10 MMOL/L (ref 10–20)
APPEARANCE UR: CLEAR
AST SERPL W P-5'-P-CCNC: 41 U/L (ref 9–39)
BASOPHILS # BLD AUTO: 0.09 X10*3/UL (ref 0–0.1)
BASOPHILS NFR BLD AUTO: 1.4 %
BILIRUB SERPL-MCNC: 1 MG/DL (ref 0–1.2)
BILIRUB UR STRIP.AUTO-MCNC: NEGATIVE MG/DL
BUN SERPL-MCNC: 19 MG/DL (ref 6–23)
CALCIUM SERPL-MCNC: 9.3 MG/DL (ref 8.6–10.3)
CHLORIDE SERPL-SCNC: 108 MMOL/L (ref 98–107)
CO2 SERPL-SCNC: 26 MMOL/L (ref 21–32)
COLOR UR: YELLOW
CREAT SERPL-MCNC: 1.1 MG/DL (ref 0.5–1.3)
EGFRCR SERPLBLD CKD-EPI 2021: 67 ML/MIN/1.73M*2
EOSINOPHIL # BLD AUTO: 0.31 X10*3/UL (ref 0–0.4)
EOSINOPHIL NFR BLD AUTO: 4.7 %
ERYTHROCYTE [DISTWIDTH] IN BLOOD BY AUTOMATED COUNT: 13.3 % (ref 11.5–14.5)
GLUCOSE SERPL-MCNC: 158 MG/DL (ref 74–99)
GLUCOSE UR STRIP.AUTO-MCNC: ABNORMAL MG/DL
HCT VFR BLD AUTO: 43.3 % (ref 41–52)
HGB BLD-MCNC: 14.4 G/DL (ref 13.5–17.5)
HOLD SPECIMEN: NORMAL
IMM GRANULOCYTES # BLD AUTO: 0.02 X10*3/UL (ref 0–0.5)
IMM GRANULOCYTES NFR BLD AUTO: 0.3 % (ref 0–0.9)
KETONES UR STRIP.AUTO-MCNC: NEGATIVE MG/DL
LACTATE SERPL-SCNC: 0.8 MMOL/L (ref 0.4–2)
LEUKOCYTE ESTERASE UR QL STRIP.AUTO: NEGATIVE
LIPASE SERPL-CCNC: 16 U/L (ref 9–82)
LYMPHOCYTES # BLD AUTO: 1.43 X10*3/UL (ref 0.8–3)
LYMPHOCYTES NFR BLD AUTO: 21.7 %
MCH RBC QN AUTO: 29.4 PG (ref 26–34)
MCHC RBC AUTO-ENTMCNC: 33.3 G/DL (ref 32–36)
MCV RBC AUTO: 89 FL (ref 80–100)
MONOCYTES # BLD AUTO: 0.63 X10*3/UL (ref 0.05–0.8)
MONOCYTES NFR BLD AUTO: 9.6 %
MUCOUS THREADS #/AREA URNS AUTO: NORMAL /LPF
NEUTROPHILS # BLD AUTO: 4.11 X10*3/UL (ref 1.6–5.5)
NEUTROPHILS NFR BLD AUTO: 62.3 %
NITRITE UR QL STRIP.AUTO: NEGATIVE
NRBC BLD-RTO: 0 /100 WBCS (ref 0–0)
PH UR STRIP.AUTO: 6 [PH]
PLATELET # BLD AUTO: 178 X10*3/UL (ref 150–450)
POTASSIUM SERPL-SCNC: 4.4 MMOL/L (ref 3.5–5.3)
PROT SERPL-MCNC: 6.7 G/DL (ref 6.4–8.2)
PROT UR STRIP.AUTO-MCNC: ABNORMAL MG/DL
RBC # BLD AUTO: 4.89 X10*6/UL (ref 4.5–5.9)
RBC # UR STRIP.AUTO: NEGATIVE /UL
RBC #/AREA URNS AUTO: NORMAL /HPF
SODIUM SERPL-SCNC: 140 MMOL/L (ref 136–145)
SP GR UR STRIP.AUTO: 1.03
UROBILINOGEN UR STRIP.AUTO-MCNC: NORMAL MG/DL
WBC # BLD AUTO: 6.6 X10*3/UL (ref 4.4–11.3)
WBC #/AREA URNS AUTO: NORMAL /HPF

## 2024-06-30 PROCEDURE — 72128 CT CHEST SPINE W/O DYE: CPT | Performed by: RADIOLOGY

## 2024-06-30 PROCEDURE — 99285 EMERGENCY DEPT VISIT HI MDM: CPT | Mod: 25

## 2024-06-30 PROCEDURE — 80053 COMPREHEN METABOLIC PANEL: CPT | Performed by: PHYSICIAN ASSISTANT

## 2024-06-30 PROCEDURE — 83690 ASSAY OF LIPASE: CPT | Performed by: PHYSICIAN ASSISTANT

## 2024-06-30 PROCEDURE — 36415 COLL VENOUS BLD VENIPUNCTURE: CPT | Performed by: PHYSICIAN ASSISTANT

## 2024-06-30 PROCEDURE — 72131 CT LUMBAR SPINE W/O DYE: CPT | Mod: RSC | Performed by: RADIOLOGY

## 2024-06-30 PROCEDURE — 85025 COMPLETE CBC W/AUTO DIFF WBC: CPT | Performed by: PHYSICIAN ASSISTANT

## 2024-06-30 PROCEDURE — 74177 CT ABD & PELVIS W/CONTRAST: CPT

## 2024-06-30 PROCEDURE — 81001 URINALYSIS AUTO W/SCOPE: CPT | Performed by: PHYSICIAN ASSISTANT

## 2024-06-30 PROCEDURE — 83605 ASSAY OF LACTIC ACID: CPT | Performed by: PHYSICIAN ASSISTANT

## 2024-06-30 PROCEDURE — 74177 CT ABD & PELVIS W/CONTRAST: CPT | Performed by: RADIOLOGY

## 2024-06-30 PROCEDURE — 72131 CT LUMBAR SPINE W/O DYE: CPT | Mod: RSC

## 2024-06-30 PROCEDURE — 2550000001 HC RX 255 CONTRASTS: Performed by: PHYSICIAN ASSISTANT

## 2024-06-30 PROCEDURE — 72128 CT CHEST SPINE W/O DYE: CPT

## 2024-06-30 ASSESSMENT — COLUMBIA-SUICIDE SEVERITY RATING SCALE - C-SSRS
1. IN THE PAST MONTH, HAVE YOU WISHED YOU WERE DEAD OR WISHED YOU COULD GO TO SLEEP AND NOT WAKE UP?: NO
6. HAVE YOU EVER DONE ANYTHING, STARTED TO DO ANYTHING, OR PREPARED TO DO ANYTHING TO END YOUR LIFE?: NO
2. HAVE YOU ACTUALLY HAD ANY THOUGHTS OF KILLING YOURSELF?: NO

## 2024-06-30 ASSESSMENT — PAIN SCALES - GENERAL: PAINLEVEL_OUTOF10: 5 - MODERATE PAIN

## 2024-06-30 ASSESSMENT — PAIN - FUNCTIONAL ASSESSMENT: PAIN_FUNCTIONAL_ASSESSMENT: 0-10

## 2024-06-30 ASSESSMENT — PAIN DESCRIPTION - PAIN TYPE: TYPE: ACUTE PAIN

## 2024-06-30 ASSESSMENT — PAIN DESCRIPTION - LOCATION: LOCATION: BACK

## 2024-06-30 ASSESSMENT — PAIN DESCRIPTION - ORIENTATION: ORIENTATION: LEFT

## 2024-06-30 NOTE — ED PROVIDER NOTES
EMERGENCY MEDICINE EVALUATION NOTE    History of Present Illness     Chief Complaint:   Chief Complaint   Patient presents with    Back Pain     Left sided back pain beginning last night. No urinary symptoms.        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  He reports it started yesterday evening.  He states he got worse throughout the night.  He mentions that even the right over here and he bumped that he on the road cause pain in the left flank.  Patient denies any urinary symptoms such as dysuria or frequency.  Patient denies any history of any kidney stones hematuria noted.  Patient has not take anything at home for symptoms.  Patient denies any injury to his back.  He denies any loss of bowel or bladder function.  Patient denies any anticoagulant use.  Patient states that he has some associated nausea but no vomiting, fevers, or chills.    Previous History     Past Medical History:   Diagnosis Date    Bipolar disorder, unspecified (Multi)     Bipolar disease, chronic    Hemothorax     Hemothorax    Other conditions influencing health status 04/29/2017    History of cough    Personal history of colonic polyps     History of colonic polyps    Personal history of other (healed) physical injury and trauma     History of sprain of knee    Personal history of other diseases of the circulatory system     History of hypertension    Personal history of other diseases of the circulatory system 05/09/2022    History of atrial fibrillation    Personal history of other diseases of the circulatory system 05/09/2022    History of atrial fibrillation    Personal history of other diseases of the musculoskeletal system and connective tissue     History of degenerative disc disease    Personal history of other diseases of the musculoskeletal system and connective tissue 02/06/2016    History of back pain    Personal history of other diseases of the nervous system and  sense organs     History of sleep apnea    Personal history of other diseases of the nervous system and sense organs     History of impaired cognition    Personal history of other diseases of the respiratory system     History of pneumothorax    Personal history of other diseases of urinary system     History of kidney disease    Personal history of other diseases of urinary system     History of chronic kidney disease    Personal history of other endocrine, nutritional and metabolic disease 02/06/2016    History of diabetes mellitus    Personal history of other endocrine, nutritional and metabolic disease     History of hyperlipidemia    Personal history of other endocrine, nutritional and metabolic disease     History of vitamin D deficiency    Personal history of other specified conditions 06/17/2020    History of fatigue    Personal history of other specified conditions 04/29/2017    History of fever    Personal history of pulmonary embolism     History of pulmonary embolism    Pleurodynia 05/09/2022    Rib pain on right side    Right upper quadrant pain 02/06/2016    Right upper quadrant abdominal pain    Shortness of breath 04/29/2017    Short of breath on exertion    Unspecified fall, subsequent encounter 05/09/2022    Fall at home, subsequent encounter     Past Surgical History:   Procedure Laterality Date    CARDIAC SURGERY  06/23/2017    Heart Surgery    CHOLECYSTECTOMY  06/23/2017    Cholecystectomy    HERNIA REPAIR  06/23/2017    Hernia Repair    KNEE SURGERY  06/23/2017    Knee Surgery Left    OTHER SURGICAL HISTORY  10/28/2019    Pacemaker insertion    OTHER SURGICAL HISTORY  10/28/2019    Root canal procedure     Social History     Tobacco Use    Smoking status: Former     Types: Cigarettes    Smokeless tobacco: Never   Vaping Use    Vaping status: Never Used   Substance Use Topics    Alcohol use: Yes     Comment: rarely    Drug use: Never     Family History   Problem Relation Name Age of Onset     "Depression Mother      Hypertension Mother      COPD Father      Diabetes Sister      Colon cancer Paternal Grandfather       Allergies   Allergen Reactions    Lisinopril Other    Pneumococcal 23-Angie Ps Vaccine Other    Ace Inhibitors Cough and Other     Cough    Other reaction(s): Other (See Comments), Other (See Comments), Other (See Comments), Other (See Comments)   Cough   Cough   Cough   Cough     Current Outpatient Medications   Medication Instructions    aspirin 81 mg EC tablet 1 tablet, oral, Daily    BD Ultra-Fine Micro Pen Needle 32 gauge x 1/4\" needle use as directed    cholecalciferol (Vitamin D-3) 5,000 Units tablet 1 tablet, oral, Daily    dapagliflozin propanediol (FARXIGA) 10 mg, oral, Daily    donepezil (Aricept) 10 mg tablet TAKE 1 TABLET BY MOUTH EVERY DAY AS DIRECTED    ezetimibe (ZETIA) 10 mg, oral, Daily    fluticasone propion-salmeteroL (Advair Diskus) 250-50 mcg/dose diskus inhaler 1 puff, inhalation, 2 times daily    FreeStyle Katie 2 [place] misc subcutaneous, As needed    FreeStyle Katie 2 Sensor kit subcutaneous, As needed    losartan (Cozaar) 50 mg tablet TAKE 1 TABLET BY MOUTH EVERY DAY    memantine (Namenda) 28 mg capsule,sprinkle,ER 24hr TAKE 1 CAPSULE BY MOUTH EVERY DAY    metFORMIN (Glucophage) 500 mg tablet 1 tablet, oral, 2 times daily    multivitamin with minerals (multivitamin-iron-folic acid) tablet 1 tablet, oral, Daily    NovoLOG Mix 70-30FlexPen U-100 100 unit/mL (70-30) injection 35 units in the morning and 6 units in the evening. Uses sliding scale.    Ozempic 1 mg, subcutaneous, Once Weekly    rosuvastatin (Crestor) 10 mg tablet TAKE 1 TABLET BY MOUTH EVERYDAY AT BEDTIME       Physical Exam     Appearance: Alert, oriented , cooperative     Skin: Intact,  dry skin, no lesions, rash, petechiae or purpura.  No zosters rash noted.     Eyes: PERRLA, EOMs intact,  Conjunctiva pink      ENT: Hearing grossly intact. Pharynx clear, uvula midline.      Neck: Supple. Trachea at " midline.      Pulmonary: Clear bilaterally. No rales, rhonchi or wheezing. No accessory muscle use or stridor.     Cardiac: Normal rate and rhythm without murmur     Abdomen: Soft, active bowel sounds.  Tenderness over left flank but no CVA tenderness noted.     Musculoskeletal: Full range of motion. no pain, edema, or deformity.      Neurological:Cranial nerves II through XII are grossly intact, normal sensation, no weakness, no focal findings identified.     Results     Labs Reviewed   COMPREHENSIVE METABOLIC PANEL - Abnormal       Result Value    Glucose 158 (*)     Sodium 140      Potassium 4.4      Chloride 108 (*)     Bicarbonate 26      Anion Gap 10      Urea Nitrogen 19      Creatinine 1.10      eGFR 67      Calcium 9.3      Albumin 4.0      Alkaline Phosphatase 59      Total Protein 6.7      AST 41 (*)     Bilirubin, Total 1.0      ALT 45     URINALYSIS WITH REFLEX CULTURE AND MICROSCOPIC - Abnormal    Color, Urine Yellow      Appearance, Urine Clear      Specific Gravity, Urine 1.030      pH, Urine 6.0      Protein, Urine 30 (1+) (*)     Glucose, Urine OVER (4+) (*)     Blood, Urine NEGATIVE      Ketones, Urine NEGATIVE      Bilirubin, Urine NEGATIVE      Urobilinogen, Urine Normal      Nitrite, Urine NEGATIVE      Leukocyte Esterase, Urine NEGATIVE     LIPASE - Normal    Lipase 16      Narrative:     Venipuncture immediately after or during the administration of Metamizole may lead to falsely low results. Testing should be performed immediately prior to Metamizole dosing.   LACTATE - Normal    Lactate 0.8      Narrative:     Venipuncture immediately after or during the administration of Metamizole may lead to falsely low results. Testing should be performed immediately  prior to Metamizole dosing.   CBC WITH AUTO DIFFERENTIAL    WBC 6.6      nRBC 0.0      RBC 4.89      Hemoglobin 14.4      Hematocrit 43.3      MCV 89      MCH 29.4      MCHC 33.3      RDW 13.3      Platelets 178      Neutrophils % 62.3       Immature Granulocytes %, Automated 0.3      Lymphocytes % 21.7      Monocytes % 9.6      Eosinophils % 4.7      Basophils % 1.4      Neutrophils Absolute 4.11      Immature Granulocytes Absolute, Automated 0.02      Lymphocytes Absolute 1.43      Monocytes Absolute 0.63      Eosinophils Absolute 0.31      Basophils Absolute 0.09     URINALYSIS WITH REFLEX CULTURE AND MICROSCOPIC    Narrative:     The following orders were created for panel order Urinalysis with Reflex Culture and Microscopic.  Procedure                               Abnormality         Status                     ---------                               -----------         ------                     Urinalysis with Reflex C...[[id number]]  Abnormal            Final result               Extra Urine Gray Tube[[id number]]                            In process                   Please view results for these tests on the individual orders.   EXTRA URINE GRAY TUBE   URINALYSIS MICROSCOPIC WITH REFLEX CULTURE    WBC, Urine 1-5      RBC, Urine 1-2      Mucus, Urine FEW       CT lumbar spine wo IV contrast   Final Result   1. Mild degenerative changes with no acute bony abnormalities.        MACRO:   None        Signed by: Maria E Salguero 6/30/2024 10:22 AM   Dictation workstation:   [id number]      CT thoracic spine wo IV contrast   Final Result        1. No acute bony abnormalities. Degenerative changes seen throughout   the thoracic spine with kyphosis.             MACRO:   None        Signed by: Maria E Salguero 6/30/2024 10:20 AM   Dictation workstation:   [id number]      CT abdomen pelvis w IV contrast   Final Result   1. No acute abdominopelvic abnormalities   2. No sign of obstructive uropathy. Stable bilateral renal cysts are   suspected.   3. Prior cholecystectomy   4. Small stable right inguinal fat containing hernia.   5. Marked atherosclerotic coronary artery calcifications             Signed by: Maria E Salguero 6/30/2024 10:15 AM   Dictation workstation:  "  [id number]            ED Course & Medical Decision Making     Medications   iohexol (OMNIPaque) 350 mg iodine/mL solution 75 mL (75 mL intravenous Given 6/30/24 0940)     Heart Rate:  [85]   Temperature:  [36 °C (96.8 °F)]   Respirations:  [16]   BP: (174)/(72)   Height:  [180.3 cm (5' 11\")]   Weight:  [77.1 kg (170 lb)]   Pulse Ox:  [97 %]    ED Course as of 06/30/24 1047   Sun Jun 30, 2024   1045 Reevaluated the patient at this time.  Updated patient and family on the workup results.  Patient's workup does not show any acute abnormalities.  Patient's blood work was within normal limits.  Patient had normal kidney function as well as normal CBC and noninfected urinalysis.  Patient did not have any blood present on urinalysis either.  CT imaging did not show any acute abnormalities other than degenerative changes of the thoracic and lumbar spine.  Patient CT abdomen pelvis did not show any specific abnormalities.  Specifically there was no kidney stone or pyelonephritis noted on left side.  Plan of care discussed with patient and he is in agreement with the plan of care of discharge at this time.  I do not believe any further workup is necessary at this time and patient and family are in agreement with this plan of care.  He was encouraged return to the emergency room immediate with any worsening symptoms or to follow-up with his primary care provider 1 to 2 days. [CJ]      ED Course User Index  [CJ] Duc Yusuf PA-C         Diagnoses as of 06/30/24 1047   Acute left flank pain       Procedures   Procedures    Diagnosis     1. Acute left flank pain        Disposition   Discharge    ED Prescriptions    None         Disclaimer: This note was dictated by speech recognition. Minor errors in transcription may be present. Please call if questions.       Duc Yusuf PA-C  06/30/24 1047    "

## 2024-08-24 DIAGNOSIS — F03.90 UNSPECIFIED DEMENTIA, UNSPECIFIED SEVERITY, WITHOUT BEHAVIORAL DISTURBANCE, PSYCHOTIC DISTURBANCE, MOOD DISTURBANCE, AND ANXIETY (MULTI): ICD-10-CM

## 2024-08-26 RX ORDER — MEMANTINE HYDROCHLORIDE 28 MG/1
CAPSULE, EXTENDED RELEASE ORAL
Qty: 90 CAPSULE | Refills: 3 | Status: SHIPPED | OUTPATIENT
Start: 2024-08-26

## 2024-09-20 ENCOUNTER — TELEPHONE (OUTPATIENT)
Dept: PRIMARY CARE | Facility: CLINIC | Age: 79
End: 2024-09-20
Payer: MEDICARE

## 2024-09-20 NOTE — TELEPHONE ENCOUNTER
Received patient assistance program application for Ozempic.  Form completed and faxed today.  Please let patient's wife Lana know that this has been completed. Thanks,  Fatou Dawn, DO

## 2024-09-25 ENCOUNTER — LAB (OUTPATIENT)
Dept: LAB | Facility: LAB | Age: 79
End: 2024-09-25
Payer: MEDICARE

## 2024-09-25 DIAGNOSIS — E11.65 TYPE 2 DIABETES MELLITUS WITH HYPERGLYCEMIA (MULTI): Primary | ICD-10-CM

## 2024-09-25 DIAGNOSIS — Z79.4 LONG TERM (CURRENT) USE OF INSULIN (MULTI): ICD-10-CM

## 2024-09-25 LAB
ALBUMIN SERPL BCP-MCNC: 4 G/DL (ref 3.4–5)
ALP SERPL-CCNC: 64 U/L (ref 33–136)
ALT SERPL W P-5'-P-CCNC: 26 U/L (ref 10–52)
ANION GAP SERPL CALC-SCNC: 9 MMOL/L (ref 10–20)
AST SERPL W P-5'-P-CCNC: 22 U/L (ref 9–39)
BILIRUB SERPL-MCNC: 1 MG/DL (ref 0–1.2)
BUN SERPL-MCNC: 17 MG/DL (ref 6–23)
CALCIUM SERPL-MCNC: 8.6 MG/DL (ref 8.6–10.3)
CHLORIDE SERPL-SCNC: 104 MMOL/L (ref 98–107)
CHOLEST SERPL-MCNC: 90 MG/DL (ref 0–199)
CHOLESTEROL/HDL RATIO: 2.6
CO2 SERPL-SCNC: 28 MMOL/L (ref 21–32)
CREAT SERPL-MCNC: 1.09 MG/DL (ref 0.5–1.3)
EGFRCR SERPLBLD CKD-EPI 2021: 68 ML/MIN/1.73M*2
GLUCOSE SERPL-MCNC: 176 MG/DL (ref 74–99)
HDLC SERPL-MCNC: 34.4 MG/DL
LDLC SERPL CALC-MCNC: 32 MG/DL
NON HDL CHOLESTEROL: 56 MG/DL (ref 0–149)
POTASSIUM SERPL-SCNC: 4.3 MMOL/L (ref 3.5–5.3)
PROT SERPL-MCNC: 6.4 G/DL (ref 6.4–8.2)
SODIUM SERPL-SCNC: 137 MMOL/L (ref 136–145)
T4 FREE SERPL-MCNC: 0.92 NG/DL (ref 0.61–1.12)
TRIGL SERPL-MCNC: 118 MG/DL (ref 0–149)
TSH SERPL-ACNC: 2.53 MIU/L (ref 0.44–3.98)
VLDL: 24 MG/DL (ref 0–40)

## 2024-09-25 PROCEDURE — 80061 LIPID PANEL: CPT

## 2024-09-25 PROCEDURE — 83036 HEMOGLOBIN GLYCOSYLATED A1C: CPT

## 2024-09-25 PROCEDURE — 36415 COLL VENOUS BLD VENIPUNCTURE: CPT

## 2024-09-25 PROCEDURE — 80053 COMPREHEN METABOLIC PANEL: CPT

## 2024-09-25 PROCEDURE — 84439 ASSAY OF FREE THYROXINE: CPT

## 2024-09-25 PROCEDURE — 84443 ASSAY THYROID STIM HORMONE: CPT

## 2024-09-26 LAB
EST. AVERAGE GLUCOSE BLD GHB EST-MCNC: 177 MG/DL
HBA1C MFR BLD: 7.8 %

## 2024-10-22 ENCOUNTER — HOSPITAL ENCOUNTER (OUTPATIENT)
Dept: CARDIOLOGY | Facility: HOSPITAL | Age: 79
Discharge: HOME | End: 2024-10-22
Payer: MEDICARE

## 2024-10-22 DIAGNOSIS — I48.91 ATRIAL FIBRILLATION, UNSPECIFIED TYPE (MULTI): ICD-10-CM

## 2024-10-22 DIAGNOSIS — Z95.0 PRESENCE OF CARDIAC PACEMAKER: Primary | ICD-10-CM

## 2024-10-22 DIAGNOSIS — Z95.0 PRESENCE OF CARDIAC PACEMAKER: ICD-10-CM

## 2024-10-22 PROCEDURE — 93296 REM INTERROG EVL PM/IDS: CPT

## 2024-11-16 RX ORDER — LOSARTAN POTASSIUM 100 MG/1
1 TABLET ORAL
COMMUNITY
Start: 2024-09-08

## 2024-11-16 NOTE — PATIENT INSTRUCTIONS
Thank you for choosing [facility] for your healthcare.   As always if you have any questions or concerns please do not hesitate to call our office at [phone] or through [employer].    Have a great day!  Fatou Dawn, DO

## 2024-11-18 ENCOUNTER — APPOINTMENT (OUTPATIENT)
Dept: PRIMARY CARE | Facility: CLINIC | Age: 79
End: 2024-11-18
Payer: MEDICARE

## 2024-11-18 ENCOUNTER — LAB (OUTPATIENT)
Dept: LAB | Facility: LAB | Age: 79
End: 2024-11-18
Payer: MEDICARE

## 2024-11-18 VITALS
HEIGHT: 71 IN | BODY MASS INDEX: 22.4 KG/M2 | DIASTOLIC BLOOD PRESSURE: 68 MMHG | RESPIRATION RATE: 18 BRPM | TEMPERATURE: 96.8 F | OXYGEN SATURATION: 99 % | HEART RATE: 68 BPM | WEIGHT: 160 LBS | SYSTOLIC BLOOD PRESSURE: 116 MMHG

## 2024-11-18 DIAGNOSIS — N18.2 CHRONIC KIDNEY DISEASE, STAGE 2 (MILD): Primary | ICD-10-CM

## 2024-11-18 DIAGNOSIS — Z79.4 TYPE 2 DIABETES MELLITUS WITH HYPERGLYCEMIA, WITH LONG-TERM CURRENT USE OF INSULIN: Chronic | ICD-10-CM

## 2024-11-18 DIAGNOSIS — I10 BENIGN ESSENTIAL HYPERTENSION: Primary | ICD-10-CM

## 2024-11-18 DIAGNOSIS — E11.65 TYPE 2 DIABETES MELLITUS WITH HYPERGLYCEMIA, WITH LONG-TERM CURRENT USE OF INSULIN: Chronic | ICD-10-CM

## 2024-11-18 LAB
ALBUMIN SERPL BCP-MCNC: 4.2 G/DL (ref 3.4–5)
ANION GAP SERPL CALC-SCNC: 7 MMOL/L (ref 10–20)
BUN SERPL-MCNC: 20 MG/DL (ref 6–23)
CALCIUM SERPL-MCNC: 9.5 MG/DL (ref 8.6–10.3)
CHLORIDE SERPL-SCNC: 104 MMOL/L (ref 98–107)
CO2 SERPL-SCNC: 33 MMOL/L (ref 21–32)
CREAT SERPL-MCNC: 1.27 MG/DL (ref 0.5–1.3)
CREAT UR-MCNC: 90.2 MG/DL (ref 20–370)
EGFRCR SERPLBLD CKD-EPI 2021: 57 ML/MIN/1.73M*2
GLUCOSE SERPL-MCNC: 211 MG/DL (ref 74–99)
MAGNESIUM SERPL-MCNC: 2.14 MG/DL (ref 1.6–2.4)
MICROALBUMIN UR-MCNC: 205.7 MG/L
MICROALBUMIN/CREAT UR: 228 UG/MG CREAT
PHOSPHATE SERPL-MCNC: 3.5 MG/DL (ref 2.5–4.9)
POTASSIUM SERPL-SCNC: 4.4 MMOL/L (ref 3.5–5.3)
SODIUM SERPL-SCNC: 140 MMOL/L (ref 136–145)

## 2024-11-18 PROCEDURE — 82570 ASSAY OF URINE CREATININE: CPT

## 2024-11-18 PROCEDURE — 3078F DIAST BP <80 MM HG: CPT | Performed by: FAMILY MEDICINE

## 2024-11-18 PROCEDURE — 82043 UR ALBUMIN QUANTITATIVE: CPT

## 2024-11-18 PROCEDURE — 1036F TOBACCO NON-USER: CPT | Performed by: FAMILY MEDICINE

## 2024-11-18 PROCEDURE — 99213 OFFICE O/P EST LOW 20 MIN: CPT | Performed by: FAMILY MEDICINE

## 2024-11-18 PROCEDURE — 3074F SYST BP LT 130 MM HG: CPT | Performed by: FAMILY MEDICINE

## 2024-11-18 PROCEDURE — 1159F MED LIST DOCD IN RCRD: CPT | Performed by: FAMILY MEDICINE

## 2024-11-18 PROCEDURE — 80069 RENAL FUNCTION PANEL: CPT

## 2024-11-18 PROCEDURE — 1160F RVW MEDS BY RX/DR IN RCRD: CPT | Performed by: FAMILY MEDICINE

## 2024-11-18 PROCEDURE — 1126F AMNT PAIN NOTED NONE PRSNT: CPT | Performed by: FAMILY MEDICINE

## 2024-11-18 PROCEDURE — 83735 ASSAY OF MAGNESIUM: CPT

## 2024-11-18 PROCEDURE — 36415 COLL VENOUS BLD VENIPUNCTURE: CPT

## 2024-11-18 PROCEDURE — G2211 COMPLEX E/M VISIT ADD ON: HCPCS | Performed by: FAMILY MEDICINE

## 2024-11-18 SDOH — ECONOMIC STABILITY: FOOD INSECURITY: WITHIN THE PAST 12 MONTHS, YOU WORRIED THAT YOUR FOOD WOULD RUN OUT BEFORE YOU GOT MONEY TO BUY MORE.: NEVER TRUE

## 2024-11-18 SDOH — ECONOMIC STABILITY: FOOD INSECURITY: WITHIN THE PAST 12 MONTHS, THE FOOD YOU BOUGHT JUST DIDN'T LAST AND YOU DIDN'T HAVE MONEY TO GET MORE.: NEVER TRUE

## 2024-11-18 ASSESSMENT — LIFESTYLE VARIABLES
SKIP TO QUESTIONS 9-10: 1
HOW MANY STANDARD DRINKS CONTAINING ALCOHOL DO YOU HAVE ON A TYPICAL DAY: PATIENT DOES NOT DRINK
HOW OFTEN DO YOU HAVE A DRINK CONTAINING ALCOHOL: NEVER
AUDIT-C TOTAL SCORE: 0
HOW OFTEN DO YOU HAVE SIX OR MORE DRINKS ON ONE OCCASION: NEVER

## 2024-11-18 ASSESSMENT — ENCOUNTER SYMPTOMS
DEPRESSION: 0
OCCASIONAL FEELINGS OF UNSTEADINESS: 0
LOSS OF SENSATION IN FEET: 0

## 2024-11-18 ASSESSMENT — PAIN SCALES - GENERAL: PAINLEVEL_OUTOF10: 0-NO PAIN

## 2024-11-18 NOTE — ASSESSMENT & PLAN NOTE
Managed by endocrinology  Eye exam done optho 1/2024  Currently on Farxiga (gets through [employer] patient assistance program), Novolog, and Ozempic. Ozempic is over $200, referral to clinical pharmacy to see if patient qualifies for  PAP program  Orders:    Referral to Clinical Pharmacy; Future

## 2024-11-18 NOTE — PROGRESS NOTES
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 male who presents for Follow-up (Chronic conditions ).  HPI      Patient Care Team:  Fatou Dawn DO as PCP - General  [facility] DO [facility]  Shyla Gallegos OD as Consulting Physician (Optometry)  Jeff Shaver MD as Consulting Physician (Nephrology)  Greg Carrington MD as Consulting Physician (Endocrinology)  Cristhian Underwood DPM as Consulting Physician (Podiatry)    Current Outpatient Medications   Medication Sig Dispense Refill    aspirin 81 mg EC tablet Take 1 tablet (81 mg) by mouth once daily.      BD Ultra-Fine Micro Pen Needle 32 gauge x 1/4\" needle use as directed      cholecalciferol (Vitamin D-3) 5,000 Units tablet Take 1 tablet (5,000 Units) by mouth once daily.      dapagliflozin (Farxiga) 5 mg Take 2 tablets (10 mg) by mouth once daily.      donepezil (Aricept) 10 mg tablet TAKE 1 TABLET BY MOUTH EVERY DAY AS DIRECTED 90 tablet 3    ezetimibe (Zetia) 10 mg tablet Take 1 tablet (10 mg) by mouth once daily.      fluticasone propion-salmeteroL (Advair Diskus) 250-50 mcg/dose diskus inhaler Inhale 1 puff 2 times a day.      FreeStyle Katie 2 [place] misc Inject under the skin if needed.      FreeStyle Katie 2 Sensor kit Inject under the skin if needed.      losartan (Cozaar) 100 mg tablet Take 1 tablet (100 mg) by mouth early in the morning..      memantine (Namenda) 28 mg capsule,sprinkle,ER 24hr TAKE 1 CAPSULE BY MOUTH EVERY DAY 90 capsule 3    metFORMIN (Glucophage) 500 mg tablet Take 1 tablet (500 mg) by mouth 2 times a day.      multivitamin with minerals (multivitamin-iron-folic acid) tablet Take 1 tablet by mouth once daily.      NovoLOG Mix 70-30FlexPen U-100 100 unit/mL (70-30) injection 35 units in the morning and 6 units in the evening. Uses sliding scale.      Ozempic 1 mg/dose (4 mg/3 mL) pen injector Inject 1 mg under the skin 1 (one) time per week.      rosuvastatin " "(Crestor) 10 mg tablet TAKE 1 TABLET BY MOUTH EVERYDAY AT BEDTIME 90 tablet 3     No current facility-administered medications for this visit.       Objective     Visit Vitals  /68 (BP Location: Right arm, Patient Position: Sitting, BP Cuff Size: Adult)   Pulse 68   Temp 36 °C (96.8 °F) (Temporal)   Resp 18   Ht 1.803 m (5' 11\")   Wt 72.6 kg (160 lb)   SpO2 99%   BMI 22.32 kg/m²   Smoking Status Former   BSA 1.91 m²        Constitutional: Well nourished, well developed, appears stated age  Eyes: no scleral icterus, no conjunctival injection  Ears: normal external auditory canal, no retraction or bulging of tympanic membranes  Neck: no thyromegaly  Cardiovascular: regular rate and rhythm, no leg edema  Respiratory: normal respiratory effort, clear to auscultation bilaterally  Neurologic: Alert, CNs II-XII grossly intact..  Psych: Appropriate mood and affect.        Assessment & Plan  Benign essential hypertension  Controlled on losartan from nephrology.          Type 2 diabetes mellitus with hyperglycemia, with long-term current use of insulin  Managed by endocrinology  Eye exam done optho 1/2024  Currently on Farxiga (gets through [employer] patient assistance program), Novolog, and Ozempic. Ozempic is over $200, referral to clinical pharmacy to see if patient qualifies for  PAP program  Orders:    Referral to Clinical Pharmacy; Future       Follow up  6 months.     Fatou Dawn, DO  "

## 2024-12-05 NOTE — PROGRESS NOTES
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   Referring Provider: Fatou Dawn, DO  - Last visit with referring provider: 11/18/24    Subjective     HPI    Current Diabetes Pharmacotherapy:    - Farxiga 10 mg daily  - Metformin 500 mg BID  - Novolog 70/30 30 units in the morning and 6 units in the evening   - Sometimes might do 8 units depending if trending high  - Ozempic 0.5 mg weekly - Saturdays (to start next week)   - Decreased from 1 mg due to weight    Social History:  Current diet:   - B: sausage/lucas, eggs, oatmeal, pancakes  - L: sandwich, soup  - D: protein, veggies  - Sweets sometimes (piece of candy)  - No pop    Current exercise:  - None    Current monitoring regimen:   Patient is using: continuous glucose monitor  Type of CGM: Katie 3 plus    Reported blood sugars:     30 day report:  - Target: 68%  - Above: 32%  - Below: 0%    Any episodes of hypoglycemia? no  - 1-2 times a week    Adverse Effects: None    Objective     There were no vitals taken for this visit.    Allergies   Allergen Reactions    Lisinopril Other    Pneumococcal 23-Angie Ps Vaccine Other    Ace Inhibitors Cough and Other     Cough    Other reaction(s): Other (See Comments), Other (See Comments), Other (See Comments), Other (See Comments)   Cough   Cough   Cough   Cough       Historical Diabetes Pharmacotherapy:  - Novolog  - Januvia  - Trulicity  - Metformin 1,000 mg BID (does not tolerate)    SECONDARY PREVENTION  - Statin? Yes   - ACE-I/ARB? Yes  - Aspirin? Yes    Pertinent PMH Review:  - PMH of Pancreatitis: No  - PMH of Retinopathy: No  - PMH of Urinary Tract Infections: No  - PMH of Yeast Infections: No  - PMH of MTC: No    Lab Review  Lab Results   Component Value Date    BILITOT 1.0 09/25/2024    CALCIUM 9.5 11/18/2024    CO2 33 (H) 11/18/2024     11/18/2024    CREATININE 1.27 11/18/2024    GLUCOSE 211 " (H) 11/18/2024    ALKPHOS 64 09/25/2024    K 4.4 11/18/2024    PROT 6.4 09/25/2024     11/18/2024    AST 22 09/25/2024    ALT 26 09/25/2024    BUN 20 11/18/2024    ANIONGAP 7 (L) 11/18/2024    MG 2.14 11/18/2024    PHOS 3.5 11/18/2024    ALBUMIN 4.2 11/18/2024    LIPASE 16 06/30/2024    GFRMALE 64 04/27/2023   GFR = 57 (2 weeks ago)    Lab Results   Component Value Date    TRIG 118 09/25/2024    CHOL 90 09/25/2024    HDL 34.4 09/25/2024     Lab Results   Component Value Date    HGBA1C 7.8 (H) 09/25/2024    HGBA1C 7.8 (H) 10/23/2023    HGBA1C 6.2 (A) 06/07/2022     The ASCVD Risk score (Fercho DK, et al., 2019) failed to calculate for the following reasons:    The 2019 ASCVD risk score is only valid for ages 40 to 79    Drug Interactions:  - None    Affordability/Accessibility:  - Ozempic is costly  - Farxiga through [employer]  - Household: 2   - Filed taxes; together     Preferred Pharmacy:  - [facility]    Assessment/Plan   Problem List Items Addressed This Visit       Type 2 diabetes mellitus with hyperglycemia, with long-term current use of insulin (Chronic)       ASSESSMENT:  Patients diabetes is controlled with most recent A1c of 7.8%.   (Goal A1c < 8% due to age + co morbidities)    Referred for DM management and cost assistance with Ozempic. He follows with nephrology and endocrinology. Ozempic was decreased from 1 mg to 0.5 mg due to patient losing weight. Today we will try to enroll patient in PAP. His wife is to send me the tax documents. At this time, patient wishes to continue diabetes management with just endocrinology so we will just keep leave appointments as PRN.    PLAN:  CONTINUE all DM medications  Follow up with clinical pharmacist: PRN  Follow up with PCP: 5/13/25    Thank you,  Alesha Paulino, PharmD  Clinical Pharmacy Specialist  [phone]  [email or web address]     Continue all meds under the continuation of care with the referring provider and clinical pharmacy team.

## 2024-12-06 ENCOUNTER — TELEMEDICINE (OUTPATIENT)
Dept: PHARMACY | Facility: HOSPITAL | Age: 79
End: 2024-12-06
Payer: MEDICARE

## 2024-12-06 DIAGNOSIS — E11.65 TYPE 2 DIABETES MELLITUS WITH HYPERGLYCEMIA, WITH LONG-TERM CURRENT USE OF INSULIN: Chronic | ICD-10-CM

## 2024-12-06 DIAGNOSIS — Z79.4 TYPE 2 DIABETES MELLITUS WITH HYPERGLYCEMIA, WITH LONG-TERM CURRENT USE OF INSULIN: Chronic | ICD-10-CM

## 2024-12-06 RX ORDER — DAPAGLIFLOZIN 10 MG/1
10 TABLET, FILM COATED ORAL EVERY 24 HOURS
COMMUNITY

## 2025-02-23 DIAGNOSIS — E78.5 HYPERLIPIDEMIA, UNSPECIFIED: ICD-10-CM

## 2025-02-24 RX ORDER — ROSUVASTATIN CALCIUM 10 MG/1
TABLET, COATED ORAL
Qty: 90 TABLET | Refills: 3 | Status: SHIPPED | OUTPATIENT
Start: 2025-02-24

## 2025-03-06 ENCOUNTER — HOSPITAL ENCOUNTER (OUTPATIENT)
Dept: CARDIOLOGY | Facility: HOSPITAL | Age: 80
Discharge: HOME | End: 2025-03-06
Payer: MEDICARE

## 2025-03-06 DIAGNOSIS — I48.91 ATRIAL FIBRILLATION, UNSPECIFIED TYPE (MULTI): ICD-10-CM

## 2025-03-06 DIAGNOSIS — Z95.0 PRESENCE OF CARDIAC PACEMAKER: Primary | ICD-10-CM

## 2025-03-06 DIAGNOSIS — Z95.0 PRESENCE OF CARDIAC PACEMAKER: ICD-10-CM

## 2025-03-06 PROCEDURE — 93280 PM DEVICE PROGR EVAL DUAL: CPT

## 2025-05-12 RX ORDER — SEMAGLUTIDE 0.68 MG/ML
0.5 INJECTION, SOLUTION SUBCUTANEOUS
COMMUNITY
Start: 2025-02-11

## 2025-05-12 RX ORDER — LOSARTAN POTASSIUM 50 MG/1
1 TABLET ORAL
COMMUNITY
Start: 2025-02-24

## 2025-05-12 NOTE — PATIENT INSTRUCTIONS
Thank you for choosing [facility] for your healthcare.   As always if you have any questions or concerns please do not hesitate to call our office at [phone] or through [employer].    Have a great day!  Fatou Dawn, DO

## 2025-05-13 ENCOUNTER — APPOINTMENT (OUTPATIENT)
Dept: PRIMARY CARE | Facility: CLINIC | Age: 80
End: 2025-05-13
Payer: MEDICARE

## 2025-05-13 VITALS
HEART RATE: 76 BPM | BODY MASS INDEX: 22.4 KG/M2 | OXYGEN SATURATION: 98 % | HEIGHT: 71 IN | RESPIRATION RATE: 20 BRPM | DIASTOLIC BLOOD PRESSURE: 65 MMHG | SYSTOLIC BLOOD PRESSURE: 119 MMHG | TEMPERATURE: 96.8 F | WEIGHT: 160 LBS

## 2025-05-13 DIAGNOSIS — E11.3293 MILD NONPROLIFERATIVE DIABETIC RETINOPATHY OF BOTH EYES WITHOUT MACULAR EDEMA ASSOCIATED WITH TYPE 2 DIABETES MELLITUS: Chronic | ICD-10-CM

## 2025-05-13 DIAGNOSIS — Z79.4 TYPE 2 DIABETES MELLITUS WITH HYPERGLYCEMIA, WITH LONG-TERM CURRENT USE OF INSULIN: Chronic | ICD-10-CM

## 2025-05-13 DIAGNOSIS — N18.2 CKD STAGE G2/A2, GFR 60-89 AND ALBUMIN CREATININE RATIO 30-299 MG/G: Chronic | ICD-10-CM

## 2025-05-13 DIAGNOSIS — E11.65 TYPE 2 DIABETES MELLITUS WITH HYPERGLYCEMIA, WITH LONG-TERM CURRENT USE OF INSULIN: Chronic | ICD-10-CM

## 2025-05-13 DIAGNOSIS — Z00.00 ROUTINE GENERAL MEDICAL EXAMINATION AT HEALTH CARE FACILITY: Primary | ICD-10-CM

## 2025-05-13 DIAGNOSIS — F03.B0 MODERATE DEMENTIA WITHOUT BEHAVIORAL DISTURBANCE, PSYCHOTIC DISTURBANCE, MOOD DISTURBANCE, OR ANXIETY, UNSPECIFIED DEMENTIA TYPE: Chronic | ICD-10-CM

## 2025-05-13 PROBLEM — E87.5 HYPERKALEMIA: Status: RESOLVED | Noted: 2024-05-13 | Resolved: 2025-05-13

## 2025-05-13 PROBLEM — E11.3299 MILD NONPROLIFERATIVE DIABETIC RETINOPATHY WITHOUT MACULAR EDEMA ASSOCIATED WITH TYPE 2 DIABETES MELLITUS: Status: RESOLVED | Noted: 2017-05-22 | Resolved: 2025-05-13

## 2025-05-13 PROCEDURE — 1126F AMNT PAIN NOTED NONE PRSNT: CPT | Performed by: FAMILY MEDICINE

## 2025-05-13 PROCEDURE — 3074F SYST BP LT 130 MM HG: CPT | Performed by: FAMILY MEDICINE

## 2025-05-13 PROCEDURE — 1036F TOBACCO NON-USER: CPT | Performed by: FAMILY MEDICINE

## 2025-05-13 PROCEDURE — 3078F DIAST BP <80 MM HG: CPT | Performed by: FAMILY MEDICINE

## 2025-05-13 PROCEDURE — 1159F MED LIST DOCD IN RCRD: CPT | Performed by: FAMILY MEDICINE

## 2025-05-13 PROCEDURE — 1170F FXNL STATUS ASSESSED: CPT | Performed by: FAMILY MEDICINE

## 2025-05-13 PROCEDURE — G0439 PPPS, SUBSEQ VISIT: HCPCS | Performed by: FAMILY MEDICINE

## 2025-05-13 PROCEDURE — 1160F RVW MEDS BY RX/DR IN RCRD: CPT | Performed by: FAMILY MEDICINE

## 2025-05-13 SDOH — ECONOMIC STABILITY: FOOD INSECURITY: WITHIN THE PAST 12 MONTHS, YOU WORRIED THAT YOUR FOOD WOULD RUN OUT BEFORE YOU GOT MONEY TO BUY MORE.: NEVER TRUE

## 2025-05-13 SDOH — ECONOMIC STABILITY: FOOD INSECURITY: WITHIN THE PAST 12 MONTHS, THE FOOD YOU BOUGHT JUST DIDN'T LAST AND YOU DIDN'T HAVE MONEY TO GET MORE.: NEVER TRUE

## 2025-05-13 ASSESSMENT — PATIENT HEALTH QUESTIONNAIRE - PHQ9
SUM OF ALL RESPONSES TO PHQ9 QUESTIONS 1 & 2: 0
2. FEELING DOWN, DEPRESSED OR HOPELESS: NOT AT ALL
1. LITTLE INTEREST OR PLEASURE IN DOING THINGS: NOT AT ALL

## 2025-05-13 ASSESSMENT — ENCOUNTER SYMPTOMS
OCCASIONAL FEELINGS OF UNSTEADINESS: 0
DEPRESSION: 0
LOSS OF SENSATION IN FEET: 0

## 2025-05-13 ASSESSMENT — ACTIVITIES OF DAILY LIVING (ADL)
DOING_HOUSEWORK: INDEPENDENT
MANAGING_FINANCES: INDEPENDENT
GROCERY_SHOPPING: INDEPENDENT
BATHING: INDEPENDENT
TAKING_MEDICATION: INDEPENDENT
DRESSING: INDEPENDENT

## 2025-05-13 ASSESSMENT — PAIN SCALES - GENERAL: PAINLEVEL_OUTOF10: 0-NO PAIN

## 2025-05-13 ASSESSMENT — LIFESTYLE VARIABLES
HOW OFTEN DO YOU HAVE SIX OR MORE DRINKS ON ONE OCCASION: NEVER
HOW OFTEN DO YOU HAVE A DRINK CONTAINING ALCOHOL: MONTHLY OR LESS
AUDIT-C TOTAL SCORE: 1
SKIP TO QUESTIONS 9-10: 1
HOW MANY STANDARD DRINKS CONTAINING ALCOHOL DO YOU HAVE ON A TYPICAL DAY: 1 OR 2

## 2025-05-13 NOTE — PROGRESS NOTES
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     Past Medical, Surgical, and Family History reviewed and updated in chart.    Reviewed all medications by prescribing practitioner or clinical pharmacist (such as prescriptions, OTCs, herbal therapies and supplements) and documented in the medical record.    Picks at his skin a lot. Lana puts antibiotic ointment on any sores.         Patient Care Team:  Fatou Dawn DO as PCP - General  Fat[facility] Dawn DO [facility]  Shyla Gallegos OD as Consulting Physician (Optometry)  Jeff Shaver MD as Consulting Physician (Nephrology)  Greg Carrington MD as Consulting Physician (Endocrinology)  Cristhian Underwood DPM as Consulting Physician (Podiatry)         Objective   Vitals:  /65 (BP Location: Right arm, Patient Position: Sitting, BP Cuff Size: Adult)   Pulse 76   Temp 36 °C (96.8 °F) (Temporal)   Resp 20   Ht 1.803 m (5' 11\")   Wt 72.6 kg (160 lb)   SpO2 98%   BMI 22.32 kg/m²       Physical Exam  Constitutional: Well nourished, well developed, appears stated age  Eyes: no scleral icterus, no conjunctival injection  Ears: normal external auditory canal, no retraction or bulging of tympanic membranes  Neck: no thyromegaly  Cardiovascular: regular rate and rhythm, no leg edema  Respiratory: normal respiratory effort, clear to auscultation bilaterally  Musculoskeletal: No gross deformities appreciated  Skin: Warm, dry. Paronychia left thumb  Neurologic: Alert, CNs II-XII grossly intact..  Psych: Appropriate mood and affect.      Assessment & Plan  Routine general medical examination at health care facility  Colonoscopy done 2017  Labs done through endocrinology  Orders:    1 Year Follow Up In Primary Care - Wellness Exam; Future    CKD stage G2/A2, GFR 60-89 and albumin creatinine ratio  mg/g  Managed by Dr. Shaver  GFR has improved from stage 3 to stage " 2  Currently on jardiance          Moderate dementia without behavioral disturbance, psychotic disturbance, mood disturbance, or anxiety, unspecified dementia type  stable on Aricept and Namenda  short term memory is a problem         Mild nonproliferative diabetic retinopathy of both eyes without macular edema associated with type 2 diabetes mellitus  Under care of ophtho         Type 2 diabetes mellitus with hyperglycemia, with long-term current use of insulin  Managed by endocrinology  Eye exam done optho 1/2024  Currently on Farxiga (gets through [employer] patient assistance program), Novolog, and Ozempic.            Briefly discussed trial of topiramate for skin picking    Follow up 6 months.  Fatou Dawn, DO

## 2025-05-13 NOTE — ASSESSMENT & PLAN NOTE
Managed by endocrinology  Eye exam done optho 1/2024  Currently on Farxiga (gets through [employer] patient assistance program), Novolog, and Ozempic.

## 2025-05-20 DIAGNOSIS — F03.90 UNSPECIFIED DEMENTIA, UNSPECIFIED SEVERITY, WITHOUT BEHAVIORAL DISTURBANCE, PSYCHOTIC DISTURBANCE, MOOD DISTURBANCE, AND ANXIETY: ICD-10-CM

## 2025-05-20 RX ORDER — DONEPEZIL HYDROCHLORIDE 10 MG/1
10 TABLET, FILM COATED ORAL DAILY
Qty: 90 TABLET | Refills: 3 | Status: SHIPPED | OUTPATIENT
Start: 2025-05-20

## 2025-06-11 ENCOUNTER — HOSPITAL ENCOUNTER (OUTPATIENT)
Dept: CARDIOLOGY | Facility: HOSPITAL | Age: 80
Discharge: HOME | End: 2025-06-11
Payer: MEDICARE

## 2025-06-11 DIAGNOSIS — Z95.0 PRESENCE OF CARDIAC PACEMAKER: ICD-10-CM

## 2025-06-11 DIAGNOSIS — I48.91 ATRIAL FIBRILLATION, UNSPECIFIED TYPE (MULTI): ICD-10-CM

## 2025-06-11 PROCEDURE — 93296 REM INTERROG EVL PM/IDS: CPT

## 2025-07-02 ENCOUNTER — HOSPITAL ENCOUNTER (OUTPATIENT)
Dept: CARDIOLOGY | Facility: HOSPITAL | Age: 80
Discharge: HOME | End: 2025-07-02
Payer: MEDICARE

## 2025-07-02 DIAGNOSIS — Z95.0 PRESENCE OF CARDIAC PACEMAKER: ICD-10-CM

## 2025-07-02 DIAGNOSIS — I48.91 ATRIAL FIBRILLATION, UNSPECIFIED TYPE (MULTI): ICD-10-CM

## 2025-08-24 DIAGNOSIS — F03.90 UNSPECIFIED DEMENTIA, UNSPECIFIED SEVERITY, WITHOUT BEHAVIORAL DISTURBANCE, PSYCHOTIC DISTURBANCE, MOOD DISTURBANCE, AND ANXIETY: ICD-10-CM

## 2025-08-25 RX ORDER — MEMANTINE HYDROCHLORIDE 28 MG/1
28 CAPSULE, EXTENDED RELEASE ORAL
Qty: 90 CAPSULE | Refills: 3 | Status: SHIPPED | OUTPATIENT
Start: 2025-08-25

## 2025-11-10 ENCOUNTER — APPOINTMENT (OUTPATIENT)
Dept: PRIMARY CARE | Facility: CLINIC | Age: 80
End: 2025-11-10
Payer: MEDICARE

## 2026-05-19 ENCOUNTER — APPOINTMENT (OUTPATIENT)
Dept: PRIMARY CARE | Facility: CLINIC | Age: 81
End: 2026-05-19
Payer: MEDICARE